# Patient Record
Sex: FEMALE | Race: WHITE | Employment: UNEMPLOYED | URBAN - METROPOLITAN AREA
[De-identification: names, ages, dates, MRNs, and addresses within clinical notes are randomized per-mention and may not be internally consistent; named-entity substitution may affect disease eponyms.]

---
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
## 2024-10-11 ENCOUNTER — OFFICE VISIT (OUTPATIENT)
Dept: OBGYN CLINIC | Age: 19
End: 2024-10-11

## 2024-10-11 VITALS
HEIGHT: 66 IN | HEART RATE: 84 BPM | DIASTOLIC BLOOD PRESSURE: 58 MMHG | WEIGHT: 111 LBS | BODY MASS INDEX: 17.84 KG/M2 | SYSTOLIC BLOOD PRESSURE: 92 MMHG

## 2024-10-11 DIAGNOSIS — N91.2 AMENORRHEA: Primary | ICD-10-CM

## 2024-10-11 DIAGNOSIS — Z34.82 ENCOUNTER FOR SUPERVISION OF OTHER NORMAL PREGNANCY, SECOND TRIMESTER: ICD-10-CM

## 2024-10-11 DIAGNOSIS — Z78.9 DATE OF LAST MENSTRUAL PERIOD (LMP) UNKNOWN: ICD-10-CM

## 2024-10-11 DIAGNOSIS — Z72.51 UNPROTECTED SEXUAL INTERCOURSE: ICD-10-CM

## 2024-10-11 RX ORDER — PNV NO.95/FERROUS FUM/FOLIC AC 28MG-0.8MG
1 TABLET ORAL DAILY
Qty: 60 TABLET | Refills: 1 | Status: SHIPPED | OUTPATIENT
Start: 2024-10-11

## 2024-10-11 SDOH — ECONOMIC STABILITY: FOOD INSECURITY: WITHIN THE PAST 12 MONTHS, THE FOOD YOU BOUGHT JUST DIDN'T LAST AND YOU DIDN'T HAVE MONEY TO GET MORE.: NEVER TRUE

## 2024-10-11 SDOH — ECONOMIC STABILITY: FOOD INSECURITY: WITHIN THE PAST 12 MONTHS, YOU WORRIED THAT YOUR FOOD WOULD RUN OUT BEFORE YOU GOT MONEY TO BUY MORE.: NEVER TRUE

## 2024-10-11 SDOH — ECONOMIC STABILITY: INCOME INSECURITY: HOW HARD IS IT FOR YOU TO PAY FOR THE VERY BASICS LIKE FOOD, HOUSING, MEDICAL CARE, AND HEATING?: NOT HARD AT ALL

## 2024-10-11 ASSESSMENT — PATIENT HEALTH QUESTIONNAIRE - PHQ9
2. FEELING DOWN, DEPRESSED OR HOPELESS: NOT AT ALL
SUM OF ALL RESPONSES TO PHQ9 QUESTIONS 1 & 2: 0
SUM OF ALL RESPONSES TO PHQ QUESTIONS 1-9: 0
1. LITTLE INTEREST OR PLEASURE IN DOING THINGS: NOT AT ALL
SUM OF ALL RESPONSES TO PHQ QUESTIONS 1-9: 0

## 2024-10-11 NOTE — PROGRESS NOTES
beyond the gestational age for Nuchal translucency evaluation Quad testing was recommended. Timing for the Quad test was reviewed. Benefits of the above testing was reviewed. A second trimester amniocentesis was also made available to the patient. Risks, Benefits and non-invasive alternative testing was reviewed.     The literature regarding a questionable link to pitocin augmentation and induction of labor, the assistance of labor contractions and the initiation of contractions to help delivery, have been reviewed with the patient regarding the increased potential of having a  with Attention Deficit Hyperactivity Disorder and or Autism. These two disorders and the ramifications of their impact on a child and the family caring for that child has been reviewed with the patient in detail. She was given the risks, benefits and alternatives of the use of this medication. She has agreed to its use in the delivery of her unborn child if needed at the time of delivery, Yes.    The patient was questioned in detail regarding any genetic misnomer history, chromosomal abnormalities, or learning disabilities in  herself, the father of the baby or their families. SHE DENIED ANY HISTORY AS STATED ABOVE: Yes    Upon completion of the visit all questions were answered and the patients follow-up and testing schedule were reviewed.  Prenatal vitamins were given.    Initial labs drawn.  Prenatal vitamins.  Problem list reviewed and updated.  Counseled about QUAD/ NIPT  Role of ultrasound in pregnancy discussed  Follow-up in 2 weeks  Early 1 hr OGTT - indicated ordered  Hep C screen indicated : no  FLU- not given   TDAP- to be given in third trimester     Suzanne Lang M.D., F.A.C.O.G     Transvaginal OB ultrasound done on 10/11/2024  Crown-rump length 40.8 mm consistent with 11 weeks gestational age final due date is May 2, 2025  Positive fetal heart tones, adequate EDGAR, normal placenta    Suzanne Lang M.D., F.A.C.O.G

## 2024-11-05 ENCOUNTER — INITIAL PRENATAL (OUTPATIENT)
Dept: OBGYN CLINIC | Age: 19
End: 2024-11-05
Payer: COMMERCIAL

## 2024-11-05 VITALS
DIASTOLIC BLOOD PRESSURE: 60 MMHG | WEIGHT: 114 LBS | BODY MASS INDEX: 18.4 KG/M2 | HEART RATE: 64 BPM | SYSTOLIC BLOOD PRESSURE: 100 MMHG

## 2024-11-05 DIAGNOSIS — N91.2 AMENORRHEA: ICD-10-CM

## 2024-11-05 DIAGNOSIS — Z3A.14 14 WEEKS GESTATION OF PREGNANCY: Primary | ICD-10-CM

## 2024-11-05 DIAGNOSIS — Z72.51 UNPROTECTED SEXUAL INTERCOURSE: ICD-10-CM

## 2024-11-05 LAB
AMPHET UR QL SCN: NORMAL
BACTERIA URNS QL MICRO: ABNORMAL /HPF
BARBITURATES UR QL SCN: NORMAL
BENZODIAZ UR QL SCN: NORMAL
BILIRUB UR QL STRIP: NEGATIVE
BUPRENORPHINE+NOR UR QL SCN: NORMAL
CANNABINOIDS UR QL SCN: NORMAL
CLARITY UR: CLEAR
COCAINE UR QL SCN: NORMAL
COLOR UR: YELLOW
DRUG SCREEN COMMENT UR-IMP: NORMAL
EPI CELLS #/AREA URNS AUTO: ABNORMAL /HPF (ref 0–5)
FENTANYL SCREEN, URINE: NORMAL
GLUCOSE UR STRIP-MCNC: NEGATIVE MG/DL
HGB UR QL STRIP: NEGATIVE
HYALINE CASTS #/AREA URNS AUTO: ABNORMAL /HPF (ref 0–5)
KETONES UR STRIP-MCNC: NEGATIVE MG/DL
LEUKOCYTE ESTERASE UR QL STRIP: ABNORMAL
METHADONE UR QL SCN: NORMAL
NITRITE UR QL STRIP: NEGATIVE
OPIATES UR QL SCN: NORMAL
OXYCODONE UR QL SCN: NORMAL
PCP UR QL SCN: NORMAL
PH UR STRIP: 6.5 [PH] (ref 5–9)
PROPOXYPH UR QL SCN: NORMAL
PROT UR STRIP-MCNC: ABNORMAL MG/DL
RBC #/AREA URNS HPF: ABNORMAL /HPF (ref 0–2)
SP GR UR STRIP: 1.02 (ref 1–1.03)
UROBILINOGEN UR STRIP-ACNC: 1 E.U./DL
WBC #/AREA URNS AUTO: ABNORMAL /HPF (ref 0–5)

## 2024-11-05 PROCEDURE — G8427 DOCREV CUR MEDS BY ELIG CLIN: HCPCS | Performed by: OBSTETRICS & GYNECOLOGY

## 2024-11-05 PROCEDURE — 99213 OFFICE O/P EST LOW 20 MIN: CPT | Performed by: OBSTETRICS & GYNECOLOGY

## 2024-11-05 PROCEDURE — 1036F TOBACCO NON-USER: CPT | Performed by: OBSTETRICS & GYNECOLOGY

## 2024-11-05 PROCEDURE — G8419 CALC BMI OUT NRM PARAM NOF/U: HCPCS | Performed by: OBSTETRICS & GYNECOLOGY

## 2024-11-05 PROCEDURE — G8484 FLU IMMUNIZE NO ADMIN: HCPCS | Performed by: OBSTETRICS & GYNECOLOGY

## 2024-11-05 RX ORDER — CYCLOBENZAPRINE HCL 10 MG
10 TABLET ORAL NIGHTLY PRN
Qty: 30 TABLET | Refills: 0 | Status: SHIPPED | OUTPATIENT
Start: 2024-11-05 | End: 2024-12-05

## 2024-11-05 RX ORDER — AZITHROMYCIN 250 MG/1
TABLET, FILM COATED ORAL
Qty: 6 TABLET | Refills: 0 | Status: SHIPPED | OUTPATIENT
Start: 2024-11-05 | End: 2024-11-15

## 2024-11-05 NOTE — PATIENT INSTRUCTIONS
[place] Housing Resources*  (Call 211/[employer] if need more resources.)        HOMELESS:  [facility] [place]:   What they offer: operate the [facility] located at [address] in [place]. This shelter serves as the only 24 hours, 365 days emergency shelter for men, women, and children. We offer a full service, 68 bed facility that provides meals, toiletries, laundry, and clothing as well as connections necessary to transition into independent housing.  Phone Number: [phone]  Website: [email or web address]     [facility]  What they offer: Homeless support/shelter.   Phone number: [phone]    Coordinated Entry:   What they offer: a system that allows for coordinated entry into a local homeless services system, as well as coordinated movement within and ultimately exit from the system.  Phone Number: 335.853.7093  Website: [email or web address]/coordinated-entry      HOUSING:   [facility] Housing:   What they Offer: providing safe, decent, and affordable housing for the residents of [facility].  Phone Number: 950.621.6682    Website: http://www.[employer].com.[employer]  [facility] [place] [facility]:   What they offer: Help veterans pay for rent   Phone Number: 157.812.3882  Website: https://www.[employer]    [facility]:  What they offer: Transitional housing for veterans  Phone number: [phone]  Website: www.[employer]      [facility]:  What they offer: Shelter for victims of domestic violence in [facility]  Phone number: [phone]

## 2024-11-05 NOTE — PROGRESS NOTES
OB visit    Celsa Amador is a 17 y.o. year old female  @14.4 wks by office 11 wk US , L unknown . Final MENDEL 25   . Complaints : denies.     POB: FTSVD @ 39 wks , Male , 6lb7oz .     PGYN: wil     PMH: denies     PSH: right knee- mass. 2017     MEDS: denies     Drug Allergies: NKDA    SOCHX: denies x 3    FH: Mother or Father , DM YES , HTN No, Other No    /60   Pulse 64   Wt 51.7 kg (114 lb)   LMP  (LMP Unknown)   BMI 18.40 kg/m²   Past Medical History:   Diagnosis Date    Bursal cyst     Right knee     Dysmenorrhea in adolescent 2021    Osteochondroma of right femur     Distal    Osteochondroma of right femur      Past Surgical History:   Procedure Laterality Date    FEMUR SURGERY Right 2017    Removal of osteochondroma from the right femur    [place] I&D DEEP ABSC BURSA/HEMATOMA THIGH/KNEE REGION Right 2017    RIGHT FEMUR OPEN EXCISION DISTAL FEMUR OSTEOCHONDROMA  (30 MIN) performed by Lauro Hancock MD at [facility] OR         Review of Systems  Constitutional: negative  Genitourinary:see above  Integument/breast: negative  Behavioral/Psych: negative  Endocrine: negative     All other systems reviewed and are negative.       Physical Exam:  /60   Pulse 64   Wt 51.7 kg (114 lb)   LMP  (LMP Unknown)   BMI 18.40 kg/m²   Skin: Warm, dry, no lesions or rashes  Extremities: Without clubbing, cyanosis and edema. Palms and nails are normal. Ambulates without difficulty  Neurological: No gross sensory or motor deficits.  Abdomen: Soft, non-tender without masses or organomegaly  bowel sounds normoactive    HOF: suprapubic     FHT : 150 bpm     Assessment:   1. 14 weeks gestation of pregnancy          Plan:   No orders of the defined types were placed in this encounter.       Orders Placed This Encounter   Medications    cyclobenzaprine (FLEXERIL) 10 MG tablet     Sig: Take 1 tablet by mouth nightly as needed for Muscle spasms     Dispense:  30 tablet     Refill:  0

## 2024-11-06 RX ORDER — NITROFURANTOIN 25; 75 MG/1; MG/1
100 CAPSULE ORAL 2 TIMES DAILY
Qty: 6 CAPSULE | Refills: 0 | Status: SHIPPED | OUTPATIENT
Start: 2024-11-06 | End: 2024-11-09

## 2024-11-07 LAB — BACTERIA UR CULT: NORMAL

## 2024-11-08 LAB
C TRACH DNA UR QL NAA+PROBE: NEGATIVE
N GONORRHOEA DNA UR QL NAA+PROBE: NEGATIVE

## 2024-11-20 DIAGNOSIS — Z72.51 UNPROTECTED SEXUAL INTERCOURSE: ICD-10-CM

## 2024-11-20 DIAGNOSIS — N91.2 AMENORRHEA: ICD-10-CM

## 2024-11-20 LAB
BASOPHILS # BLD: 0.1 K/UL (ref 0–0.2)
BASOPHILS NFR BLD: 0.5 %
EOSINOPHIL # BLD: 0.1 K/UL (ref 0–0.7)
EOSINOPHIL NFR BLD: 0.6 %
ERYTHROCYTE [DISTWIDTH] IN BLOOD BY AUTOMATED COUNT: 12.6 % (ref 11.5–14.5)
GONADOTROPIN, CHORIONIC (HCG) QUANT: NORMAL MIU/ML
HBV SURFACE AG SERPL QL IA: NORMAL
HCT VFR BLD AUTO: 37.3 % (ref 37–47)
HGB BLD-MCNC: 13.1 G/DL (ref 12–16)
LYMPHOCYTES # BLD: 1.7 K/UL (ref 1–4.8)
LYMPHOCYTES NFR BLD: 16.5 %
MCH RBC QN AUTO: 30.1 PG (ref 27–31.3)
MCHC RBC AUTO-ENTMCNC: 35.1 % (ref 33–37)
MCV RBC AUTO: 85.7 FL (ref 79.4–94.8)
MONOCYTES # BLD: 0.4 K/UL (ref 0.2–0.8)
MONOCYTES NFR BLD: 4.1 %
NEUTROPHILS # BLD: 8.1 K/UL (ref 1.4–6.5)
NEUTS SEG NFR BLD: 78.1 %
PLATELET # BLD AUTO: 251 K/UL (ref 130–400)
RBC # BLD AUTO: 4.35 M/UL (ref 4.2–5.4)
RUBELLA ANTIBODY IGG: 281.8 IU/ML
WBC # BLD AUTO: 10.3 K/UL (ref 4.5–11)

## 2024-11-21 LAB
ABO/RH: NORMAL
ANTIBODY SCREEN: NORMAL
HEPATITIS C ANTIBODY: NONREACTIVE
HIV AG/AB: NONREACTIVE
RPR SER QL: NORMAL

## 2024-11-22 ENCOUNTER — TELEPHONE (OUTPATIENT)
Dept: OBGYN CLINIC | Age: 19
End: 2024-11-22

## 2024-11-22 LAB
HGB A1 MFR BLD: 97.1 % (ref 95–97.9)
HGB A2 MFR BLD: 2.7 % (ref 2–3.5)
HGB C MFR BLD: 0 % (ref 0–0)
HGB E MFR BLD: 0 % (ref 0–0)
HGB F MFR BLD: 0.2 % (ref 0–2.1)
HGB FRACT BLD ELPH-IMP: NORMAL
HGB OTHER MFR BLD: 0 % (ref 0–0)
HGB S BLD QL SOLY: NORMAL
HGB S MFR BLD: 0 % (ref 0–0)
PATH INTERP BLD-IMP: NORMAL
VZV IGG SER IA-ACNC: 2.2 S/CO

## 2024-11-22 NOTE — TELEPHONE ENCOUNTER
Patient recently on antibiotics for UTI. C/o vaginal itching and abnormal discharge. 17w pregnant.

## 2024-11-23 RX ORDER — FLUCONAZOLE 150 MG/1
TABLET ORAL
Qty: 3 TABLET | Refills: 0 | Status: SHIPPED | OUTPATIENT
Start: 2024-11-23

## 2024-12-03 ENCOUNTER — ROUTINE PRENATAL (OUTPATIENT)
Dept: OBGYN CLINIC | Age: 19
End: 2024-12-03
Payer: COMMERCIAL

## 2024-12-03 VITALS
BODY MASS INDEX: 18.72 KG/M2 | WEIGHT: 116 LBS | SYSTOLIC BLOOD PRESSURE: 94 MMHG | HEART RATE: 80 BPM | DIASTOLIC BLOOD PRESSURE: 60 MMHG

## 2024-12-03 DIAGNOSIS — Z34.82 ENCOUNTER FOR SUPERVISION OF OTHER NORMAL PREGNANCY IN SECOND TRIMESTER: Primary | ICD-10-CM

## 2024-12-03 DIAGNOSIS — Z3A.18 18 WEEKS GESTATION OF PREGNANCY: ICD-10-CM

## 2024-12-03 PROCEDURE — G8420 CALC BMI NORM PARAMETERS: HCPCS | Performed by: OBSTETRICS & GYNECOLOGY

## 2024-12-03 PROCEDURE — 99213 OFFICE O/P EST LOW 20 MIN: CPT | Performed by: OBSTETRICS & GYNECOLOGY

## 2024-12-03 PROCEDURE — 1036F TOBACCO NON-USER: CPT | Performed by: OBSTETRICS & GYNECOLOGY

## 2024-12-03 PROCEDURE — G8427 DOCREV CUR MEDS BY ELIG CLIN: HCPCS | Performed by: OBSTETRICS & GYNECOLOGY

## 2024-12-03 PROCEDURE — G8484 FLU IMMUNIZE NO ADMIN: HCPCS | Performed by: OBSTETRICS & GYNECOLOGY

## 2024-12-03 NOTE — PROGRESS NOTES
OB visit    Celsa Amador is a 17 y.o. year old female  @18.4 wks by office 11 wk US , L unknown . Final MENDEL 25   . Complaints : denies.     POB: FTSVD @ 39 wks , Male , 6lb7oz .     PGYN: wil     PMH: denies     PSH: right knee- mass. 2017     MEDS: denies     Drug Allergies: NKDA    SOCHX: denies x 3    FH: Mother or Father , DM YES , HTN No, Other No    BP 94/60   Pulse 80   Wt 52.6 kg (116 lb)   LMP  (LMP Unknown)   BMI 18.72 kg/m²   Past Medical History:   Diagnosis Date    Bursal cyst     Right knee     Dysmenorrhea in adolescent 2021    Osteochondroma of right femur     Distal    Osteochondroma of right femur      Past Surgical History:   Procedure Laterality Date    FEMUR SURGERY Right 2017    Removal of osteochondroma from the right femur    [place] I&D DEEP ABSC BURSA/HEMATOMA THIGH/KNEE REGION Right 2017    RIGHT FEMUR OPEN EXCISION DISTAL FEMUR OSTEOCHONDROMA  (30 MIN) performed by Lauro Hancock MD at [facility] OR         Review of Systems  Constitutional: negative  Genitourinary:see above  Integument/breast: negative  Behavioral/Psych: negative  Endocrine: negative     All other systems reviewed and are negative.       Physical Exam:  BP 94/60   Pulse 80   Wt 52.6 kg (116 lb)   LMP  (LMP Unknown)   BMI 18.72 kg/m²   Skin: Warm, dry, no lesions or rashes  Extremities: Without clubbing, cyanosis and edema. Palms and nails are normal. Ambulates without difficulty  Neurological: No gross sensory or motor deficits.  Abdomen: Soft, non-tender without masses or organomegaly  bowel sounds normoactive    HOF: umbilical     FHT : 150 bpm     Assessment:   1. Encounter for supervision of other normal pregnancy in second trimester    2. 18 weeks gestation of pregnancy          Plan:   Orders Placed This Encounter   Procedures    US OB 14 PLUS WEEKS SINGLE OR FIRST GESTATION     This procedure can be scheduled via [employer].      Standing Status:   Future     Standing Expiration Date:

## 2024-12-12 ENCOUNTER — HOSPITAL ENCOUNTER (OUTPATIENT)
Dept: ULTRASOUND IMAGING | Age: 19
Discharge: HOME | End: 2024-12-14
Payer: COMMERCIAL

## 2024-12-12 DIAGNOSIS — Z34.82 ENCOUNTER FOR SUPERVISION OF OTHER NORMAL PREGNANCY IN SECOND TRIMESTER: ICD-10-CM

## 2024-12-12 PROCEDURE — 76805 OB US >/= 14 WKS SNGL FETUS: CPT

## 2025-01-03 ENCOUNTER — ROUTINE PRENATAL (OUTPATIENT)
Dept: OBGYN CLINIC | Age: 20
End: 2025-01-03
Payer: COMMERCIAL

## 2025-01-03 VITALS
SYSTOLIC BLOOD PRESSURE: 94 MMHG | BODY MASS INDEX: 19.21 KG/M2 | HEART RATE: 84 BPM | DIASTOLIC BLOOD PRESSURE: 60 MMHG | WEIGHT: 119 LBS

## 2025-01-03 DIAGNOSIS — Z34.82 ENCOUNTER FOR SUPERVISION OF OTHER NORMAL PREGNANCY IN SECOND TRIMESTER: Primary | ICD-10-CM

## 2025-01-03 DIAGNOSIS — Z3A.23 23 WEEKS GESTATION OF PREGNANCY: ICD-10-CM

## 2025-01-03 PROCEDURE — G8427 DOCREV CUR MEDS BY ELIG CLIN: HCPCS | Performed by: OBSTETRICS & GYNECOLOGY

## 2025-01-03 PROCEDURE — 99213 OFFICE O/P EST LOW 20 MIN: CPT | Performed by: OBSTETRICS & GYNECOLOGY

## 2025-01-03 PROCEDURE — 1036F TOBACCO NON-USER: CPT | Performed by: OBSTETRICS & GYNECOLOGY

## 2025-01-03 PROCEDURE — G8420 CALC BMI NORM PARAMETERS: HCPCS | Performed by: OBSTETRICS & GYNECOLOGY

## 2025-01-03 ASSESSMENT — PATIENT HEALTH QUESTIONNAIRE - PHQ9
SUM OF ALL RESPONSES TO PHQ QUESTIONS 1-9: 0
2. FEELING DOWN, DEPRESSED OR HOPELESS: NOT AT ALL
1. LITTLE INTEREST OR PLEASURE IN DOING THINGS: NOT AT ALL
SUM OF ALL RESPONSES TO PHQ9 QUESTIONS 1 & 2: 0

## 2025-01-03 NOTE — PROGRESS NOTES
OB visit    Celsa Amador is a 17 y.o. year old female  @ 23 wks by office 11 wk US , L unknown . Final MENDEL 25   . Complaints : denies.     POB: FTSVD @ 39 wks , Male , 6lb7oz .     PGYN: wil     PMH: denies     PSH: right knee- mass. 2017     MEDS: denies     Drug Allergies: NKDA    SOCHX: denies x 3    FH: Mother or Father , DM YES , HTN No, Other No    BP 94/60   Pulse 84   Wt 54 kg (119 lb)   LMP  (LMP Unknown)   BMI 19.21 kg/m²   Past Medical History:   Diagnosis Date    Bursal cyst     Right knee     Dysmenorrhea in adolescent 2021    Osteochondroma of right femur     Distal    Osteochondroma of right femur      Past Surgical History:   Procedure Laterality Date    FEMUR SURGERY Right 2017    Removal of osteochondroma from the right femur    [place] I&D DEEP ABSC BURSA/HEMATOMA THIGH/KNEE REGION Right 2017    RIGHT FEMUR OPEN EXCISION DISTAL FEMUR OSTEOCHONDROMA  (30 MIN) performed by Lauro Hancock MD at [facility] OR         Review of Systems  Constitutional: negative  Genitourinary:see above  Integument/breast: negative  Behavioral/Psych: negative  Endocrine: negative     All other systems reviewed and are negative.       Physical Exam:  BP 94/60   Pulse 84   Wt 54 kg (119 lb)   LMP  (LMP Unknown)   BMI 19.21 kg/m²   Skin: Warm, dry, no lesions or rashes  Extremities: Without clubbing, cyanosis and edema. Palms and nails are normal. Ambulates without difficulty  Neurological: No gross sensory or motor deficits.  Abdomen: Soft, non-tender without masses or organomegaly  bowel sounds normoactive    HOF: umbilical     FHT : 150 bpm     Assessment:   1. Encounter for supervision of other normal pregnancy in second trimester    2. 23 weeks gestation of pregnancy          Plan:   Orders Placed This Encounter   Procedures    CBC with Auto Differential     Standing Status:   Future     Standing Expiration Date:   1/3/2026    Glucose tolerance, 1 hour     Standing Status:   Future

## 2025-01-29 DIAGNOSIS — Z34.82 ENCOUNTER FOR SUPERVISION OF OTHER NORMAL PREGNANCY IN SECOND TRIMESTER: ICD-10-CM

## 2025-01-29 LAB
BASOPHILS # BLD: 0 K/UL (ref 0–0.2)
BASOPHILS NFR BLD: 0.2 %
EOSINOPHIL # BLD: 0.1 K/UL (ref 0–0.7)
EOSINOPHIL NFR BLD: 0.9 %
ERYTHROCYTE [DISTWIDTH] IN BLOOD BY AUTOMATED COUNT: 12.8 % (ref 11.5–14.5)
GLUCOSE, 1HR PP: 109 MG/DL (ref 60–140)
HCT VFR BLD AUTO: 35.1 % (ref 37–47)
HGB BLD-MCNC: 11.7 G/DL (ref 12–16)
LYMPHOCYTES # BLD: 1 K/UL (ref 1–4.8)
LYMPHOCYTES NFR BLD: 11.6 %
MCH RBC QN AUTO: 29.5 PG (ref 27–31.3)
MCHC RBC AUTO-ENTMCNC: 33.3 % (ref 33–37)
MCV RBC AUTO: 88.4 FL (ref 79.4–94.8)
MONOCYTES # BLD: 0.4 K/UL (ref 0.2–0.8)
MONOCYTES NFR BLD: 5 %
NEUTROPHILS # BLD: 7.2 K/UL (ref 1.4–6.5)
NEUTS SEG NFR BLD: 81.8 %
PLATELET # BLD AUTO: 192 K/UL (ref 130–400)
RBC # BLD AUTO: 3.97 M/UL (ref 4.2–5.4)
REAGIN+T PALLIDUM IGG+IGM SERPL-IMP: NORMAL
WBC # BLD AUTO: 8.8 K/UL (ref 4.5–11)

## 2025-02-03 ENCOUNTER — ROUTINE PRENATAL (OUTPATIENT)
Dept: OBGYN CLINIC | Age: 20
End: 2025-02-03
Payer: COMMERCIAL

## 2025-02-03 VITALS
BODY MASS INDEX: 20.34 KG/M2 | WEIGHT: 126 LBS | HEART RATE: 68 BPM | DIASTOLIC BLOOD PRESSURE: 60 MMHG | SYSTOLIC BLOOD PRESSURE: 102 MMHG

## 2025-02-03 DIAGNOSIS — Z34.82 ENCOUNTER FOR SUPERVISION OF OTHER NORMAL PREGNANCY IN SECOND TRIMESTER: Primary | ICD-10-CM

## 2025-02-03 DIAGNOSIS — O26.843 FUNDAL HEIGHT LOW FOR DATES IN THIRD TRIMESTER: ICD-10-CM

## 2025-02-03 DIAGNOSIS — Z3A.27 27 WEEKS GESTATION OF PREGNANCY: ICD-10-CM

## 2025-02-03 PROCEDURE — 99213 OFFICE O/P EST LOW 20 MIN: CPT | Performed by: OBSTETRICS & GYNECOLOGY

## 2025-02-03 PROCEDURE — 90471 IMMUNIZATION ADMIN: CPT | Performed by: OBSTETRICS & GYNECOLOGY

## 2025-02-03 PROCEDURE — 1036F TOBACCO NON-USER: CPT | Performed by: OBSTETRICS & GYNECOLOGY

## 2025-02-03 PROCEDURE — 90715 TDAP VACCINE 7 YRS/> IM: CPT | Performed by: OBSTETRICS & GYNECOLOGY

## 2025-02-03 PROCEDURE — G8420 CALC BMI NORM PARAMETERS: HCPCS | Performed by: OBSTETRICS & GYNECOLOGY

## 2025-02-03 PROCEDURE — G8427 DOCREV CUR MEDS BY ELIG CLIN: HCPCS | Performed by: OBSTETRICS & GYNECOLOGY

## 2025-02-03 SDOH — ECONOMIC STABILITY: FOOD INSECURITY: WITHIN THE PAST 12 MONTHS, THE FOOD YOU BOUGHT JUST DIDN'T LAST AND YOU DIDN'T HAVE MONEY TO GET MORE.: NEVER TRUE

## 2025-02-03 SDOH — ECONOMIC STABILITY: FOOD INSECURITY: WITHIN THE PAST 12 MONTHS, YOU WORRIED THAT YOUR FOOD WOULD RUN OUT BEFORE YOU GOT MONEY TO BUY MORE.: NEVER TRUE

## 2025-02-03 NOTE — PROGRESS NOTES
OB visit    Celsa Amador is a 17 y.o. year old female  @ 27.3 wks by office 11 wk US , L unknown . Final MENDEL 25   . Complaints : denies.     POB: FTSVD @ 39 wks , Male , 6lb7oz .     PGYN: wil     PMH: denies     PSH: right knee- mass. 2017     MEDS: denies     Drug Allergies: NKDA    SOCHX: denies x 3    FH: Mother or Father , DM YES , HTN No, Other No    /60   Pulse 68   Wt 57.2 kg (126 lb)   LMP  (LMP Unknown)   BMI 20.34 kg/m²   Past Medical History:   Diagnosis Date    Bursal cyst     Right knee     Dysmenorrhea in adolescent 2021    Osteochondroma of right femur     Distal    Osteochondroma of right femur      Past Surgical History:   Procedure Laterality Date    FEMUR SURGERY Right 2017    Removal of osteochondroma from the right femur    [place] I&D DEEP ABSC BURSA/HEMATOMA THIGH/KNEE REGION Right 2017    RIGHT FEMUR OPEN EXCISION DISTAL FEMUR OSTEOCHONDROMA  (30 MIN) performed by Lauro Hancock MD at [facility] OR         Review of Systems  Constitutional: negative  Genitourinary:see above  Integument/breast: negative  Behavioral/Psych: negative  Endocrine: negative     All other systems reviewed and are negative.       Physical Exam:  /60   Pulse 68   Wt 57.2 kg (126 lb)   LMP  (LMP Unknown)   BMI 20.34 kg/m²   Skin: Warm, dry, no lesions or rashes  Extremities: Without clubbing, cyanosis and edema. Palms and nails are normal. Ambulates without difficulty  Neurological: No gross sensory or motor deficits.  Abdomen: Soft, non-tender without masses or organomegaly  bowel sounds normoactive    HOF: 27 cm     FHT : 150 bpm     Assessment:   1. Encounter for supervision of other normal pregnancy in second trimester    2. 27 weeks gestation of pregnancy    3. Fundal height low for dates in third trimester            Plan:   Orders Placed This Encounter   Procedures    US OB 1 OR MORE FETUS LIMITED     This procedure can be scheduled via MyChart.      Standing Status:

## 2025-02-06 ENCOUNTER — HOSPITAL ENCOUNTER (OUTPATIENT)
Dept: ULTRASOUND IMAGING | Age: 20
Discharge: HOME | End: 2025-02-08
Payer: COMMERCIAL

## 2025-02-06 DIAGNOSIS — O26.843 FUNDAL HEIGHT LOW FOR DATES IN THIRD TRIMESTER: ICD-10-CM

## 2025-02-06 DIAGNOSIS — Z34.82 ENCOUNTER FOR SUPERVISION OF OTHER NORMAL PREGNANCY IN SECOND TRIMESTER: ICD-10-CM

## 2025-02-06 PROCEDURE — 76817 TRANSVAGINAL US OBSTETRIC: CPT

## 2025-02-06 PROCEDURE — 76815 OB US LIMITED FETUS(S): CPT

## 2025-02-13 RX ORDER — METRONIDAZOLE 500 MG/1
500 TABLET ORAL 2 TIMES DAILY
Qty: 14 TABLET | Refills: 0 | Status: SHIPPED | OUTPATIENT
Start: 2025-02-13 | End: 2025-02-20

## 2025-02-24 ENCOUNTER — ROUTINE PRENATAL (OUTPATIENT)
Dept: OBGYN CLINIC | Age: 20
End: 2025-02-24
Payer: COMMERCIAL

## 2025-02-24 VITALS
SYSTOLIC BLOOD PRESSURE: 112 MMHG | HEART RATE: 80 BPM | BODY MASS INDEX: 20.66 KG/M2 | WEIGHT: 128 LBS | DIASTOLIC BLOOD PRESSURE: 64 MMHG

## 2025-02-24 DIAGNOSIS — Z34.82 ENCOUNTER FOR SUPERVISION OF OTHER NORMAL PREGNANCY IN SECOND TRIMESTER: Primary | ICD-10-CM

## 2025-02-24 DIAGNOSIS — Z3A.30 30 WEEKS GESTATION OF PREGNANCY: ICD-10-CM

## 2025-02-24 PROCEDURE — 99213 OFFICE O/P EST LOW 20 MIN: CPT | Performed by: OBSTETRICS & GYNECOLOGY

## 2025-02-24 PROCEDURE — G8420 CALC BMI NORM PARAMETERS: HCPCS | Performed by: OBSTETRICS & GYNECOLOGY

## 2025-02-24 PROCEDURE — G8427 DOCREV CUR MEDS BY ELIG CLIN: HCPCS | Performed by: OBSTETRICS & GYNECOLOGY

## 2025-02-24 PROCEDURE — 1036F TOBACCO NON-USER: CPT | Performed by: OBSTETRICS & GYNECOLOGY

## 2025-02-24 NOTE — PROGRESS NOTES
OB visit    Celsa Amador is a 17 y.o. year old female  @ 30.3 wks by office 11 wk US , L unknown . Final MENDEL 25   . Complaints : denies.     POB: FTSVD @ 39 wks , Male , 6lb7oz .     PGYN: wil     PMH: denies     PSH: right knee- mass. 2017     MEDS: denies     Drug Allergies: NKDA    SOCHX: denies x 3    FH: Mother or Father , DM YES , HTN No, Other No    /64   Pulse 80   Wt 58.1 kg (128 lb)   LMP  (LMP Unknown)   BMI 20.66 kg/m²   Past Medical History:   Diagnosis Date    Bursal cyst     Right knee     Dysmenorrhea in adolescent 2021    Osteochondroma of right femur     Distal    Osteochondroma of right femur      Past Surgical History:   Procedure Laterality Date    FEMUR SURGERY Right 2017    Removal of osteochondroma from the right femur    [place] I&D DEEP ABSC BURSA/HEMATOMA THIGH/KNEE REGION Right 2017    RIGHT FEMUR OPEN EXCISION DISTAL FEMUR OSTEOCHONDROMA  (30 MIN) [facility]         Review of Systems  Constitutional: negative  Genitourinary:see above  Integument/breast: negative  Behavioral/Psych: negative  Endocrine: negative     All other systems reviewed and are negative.       Physical Exam:  /64   Pulse 80   Wt 58.1 kg (128 lb)   LMP  (LMP Unknown)   BMI 20.66 kg/m²   Skin: Warm, dry, no lesions or rashes  Extremities: Without clubbing, cyanosis and edema. Palms and nails are normal. Ambulates without difficulty  Neurological: No gross sensory or motor deficits.  Abdomen: Soft, non-tender without masses or organomegaly  bowel sounds normoactive    HOF: 30 cm     FHT : 150 bpm     Assessment:   1. Encounter for supervision of other normal pregnancy in second trimester    2. 30 weeks gestation of pregnancy          Plan:   No orders of the defined types were placed in this encounter.       No orders of the defined types were placed in this encounter.    Plan:   Suzanne Lang MD    Follow-up in 2 weeks  Early 1 hr OGTT -

## 2025-03-10 ENCOUNTER — ROUTINE PRENATAL (OUTPATIENT)
Dept: OBGYN CLINIC | Age: 20
End: 2025-03-10
Payer: COMMERCIAL

## 2025-03-10 VITALS
HEART RATE: 68 BPM | SYSTOLIC BLOOD PRESSURE: 100 MMHG | DIASTOLIC BLOOD PRESSURE: 62 MMHG | WEIGHT: 129 LBS | BODY MASS INDEX: 20.82 KG/M2

## 2025-03-10 DIAGNOSIS — Z34.82 ENCOUNTER FOR SUPERVISION OF OTHER NORMAL PREGNANCY IN SECOND TRIMESTER: Primary | ICD-10-CM

## 2025-03-10 DIAGNOSIS — Z3A.32 32 WEEKS GESTATION OF PREGNANCY: ICD-10-CM

## 2025-03-10 DIAGNOSIS — O26.843 FUNDAL HEIGHT LOW FOR DATES IN THIRD TRIMESTER: ICD-10-CM

## 2025-03-10 PROCEDURE — 99213 OFFICE O/P EST LOW 20 MIN: CPT | Performed by: OBSTETRICS & GYNECOLOGY

## 2025-03-10 PROCEDURE — G8420 CALC BMI NORM PARAMETERS: HCPCS | Performed by: OBSTETRICS & GYNECOLOGY

## 2025-03-10 PROCEDURE — G8427 DOCREV CUR MEDS BY ELIG CLIN: HCPCS | Performed by: OBSTETRICS & GYNECOLOGY

## 2025-03-10 PROCEDURE — 1036F TOBACCO NON-USER: CPT | Performed by: OBSTETRICS & GYNECOLOGY

## 2025-03-10 RX ORDER — NITROFURANTOIN 25; 75 MG/1; MG/1
100 CAPSULE ORAL 2 TIMES DAILY
Qty: 14 CAPSULE | Refills: 0 | Status: SHIPPED | OUTPATIENT
Start: 2025-03-10 | End: 2025-03-17

## 2025-03-10 NOTE — PROGRESS NOTES
OB visit    Celsa Amador is a 17 y.o. year old female  @ 32.3 wks by office 11 wk US , L unknown . Final MENDEL 25   . Complaints : denies.     POB: FTSVD @ 39 wks , Male , 6lb7oz .     PGYN: wil     PMH: denies     PSH: right knee- mass. 2017     MEDS: denies     Drug Allergies: NKDA    SOCHX: denies x 3    FH: Mother or Father , DM YES , HTN No, Other No    /62   Pulse 68   Wt 58.5 kg (129 lb)   LMP  (LMP Unknown)   BMI 20.82 kg/m²   Past Medical History:   Diagnosis Date    Bursal cyst     Right knee     Dysmenorrhea in adolescent 2021    Osteochondroma of right femur     Distal    Osteochondroma of right femur      Past Surgical History:   Procedure Laterality Date    FEMUR SURGERY Right 2017    Removal of osteochondroma from the right femur    [place] I&D DEEP ABSC BURSA/HEMATOMA THIGH/KNEE REGION Right 2017    RIGHT FEMUR OPEN EXCISION DISTAL FEMUR OSTEOCHONDROMA  (30 MIN) performed by Lauro Hancock MD at [facility] OR         Review of Systems  Constitutional: negative  Genitourinary:see above  Integument/breast: negative  Behavioral/Psych: negative  Endocrine: negative     All other systems reviewed and are negative.       Physical Exam:  /62   Pulse 68   Wt 58.5 kg (129 lb)   LMP  (LMP Unknown)   BMI 20.82 kg/m²   Skin: Warm, dry, no lesions or rashes  Extremities: Without clubbing, cyanosis and edema. Palms and nails are normal. Ambulates without difficulty  Neurological: No gross sensory or motor deficits.  Abdomen: Soft, non-tender without masses or organomegaly  bowel sounds normoactive    HOF: 31 cm     FHT : 150 bpm     Assessment:   1. Encounter for supervision of other normal pregnancy in second trimester    2. 32 weeks gestation of pregnancy    3. Fundal height low for dates in third trimester            Plan:   Orders Placed This Encounter   Procedures    US OB 1 OR MORE FETUS LIMITED     This procedure can be scheduled via MyChart.      Standing Status:

## 2025-03-13 ENCOUNTER — HOSPITAL ENCOUNTER (OUTPATIENT)
Dept: ULTRASOUND IMAGING | Age: 20
Discharge: HOME | End: 2025-03-15
Payer: COMMERCIAL

## 2025-03-13 DIAGNOSIS — O26.843 FUNDAL HEIGHT LOW FOR DATES IN THIRD TRIMESTER: ICD-10-CM

## 2025-03-13 PROCEDURE — 76817 TRANSVAGINAL US OBSTETRIC: CPT

## 2025-03-13 PROCEDURE — 76815 OB US LIMITED FETUS(S): CPT

## 2025-03-24 ENCOUNTER — ROUTINE PRENATAL (OUTPATIENT)
Dept: OBGYN CLINIC | Age: 20
End: 2025-03-24
Payer: COMMERCIAL

## 2025-03-24 VITALS
BODY MASS INDEX: 21.31 KG/M2 | DIASTOLIC BLOOD PRESSURE: 62 MMHG | HEART RATE: 84 BPM | WEIGHT: 132 LBS | SYSTOLIC BLOOD PRESSURE: 100 MMHG

## 2025-03-24 DIAGNOSIS — Z34.82 ENCOUNTER FOR SUPERVISION OF OTHER NORMAL PREGNANCY IN SECOND TRIMESTER: Primary | ICD-10-CM

## 2025-03-24 DIAGNOSIS — Z3A.34 34 WEEKS GESTATION OF PREGNANCY: ICD-10-CM

## 2025-03-24 PROCEDURE — G8420 CALC BMI NORM PARAMETERS: HCPCS | Performed by: OBSTETRICS & GYNECOLOGY

## 2025-03-24 PROCEDURE — 1036F TOBACCO NON-USER: CPT | Performed by: OBSTETRICS & GYNECOLOGY

## 2025-03-24 PROCEDURE — G8427 DOCREV CUR MEDS BY ELIG CLIN: HCPCS | Performed by: OBSTETRICS & GYNECOLOGY

## 2025-03-24 PROCEDURE — 99213 OFFICE O/P EST LOW 20 MIN: CPT | Performed by: OBSTETRICS & GYNECOLOGY

## 2025-03-24 NOTE — PROGRESS NOTES
OB visit    Celsa Amador is a 17 y.o. year old female  @ 34.3 wks by office 11 wk US , L unknown . Final MENDEL 25   . Complaints : denies.     POB: FTSVD @ 39 wks , Male , 6lb7oz .     PGYN: wil     PMH: denies     PSH: right knee- mass. 2017     MEDS: denies     Drug Allergies: NKDA    SOCHX: denies x 3    FH: Mother or Father , DM YES , HTN No, Other No    /62   Pulse 84   Wt 59.9 kg (132 lb)   LMP  (LMP Unknown)   BMI 21.31 kg/m²   Past Medical History:   Diagnosis Date    Bursal cyst     Right knee     Dysmenorrhea in adolescent 2021    Osteochondroma of right femur     Distal    Osteochondroma of right femur      Past Surgical History:   Procedure Laterality Date    FEMUR SURGERY Right 2017    Removal of osteochondroma from the right femur    [place] I&D DEEP ABSC BURSA/HEMATOMA THIGH/KNEE REGION Right 2017    RIGHT FEMUR OPEN EXCISION DISTAL FEMUR OSTEOCHONDROMA  (30 MIN) performed by Lauro Hancock MD at [facility] OR         Review of Systems  Constitutional: negative  Genitourinary:see above  Integument/breast: negative  Behavioral/Psych: negative  Endocrine: negative     All other systems reviewed and are negative.       Physical Exam:  /62   Pulse 84   Wt 59.9 kg (132 lb)   LMP  (LMP Unknown)   BMI 21.31 kg/m²   Skin: Warm, dry, no lesions or rashes  Extremities: Without clubbing, cyanosis and edema. Palms and nails are normal. Ambulates without difficulty  Neurological: No gross sensory or motor deficits.  Abdomen: Soft, non-tender without masses or organomegaly  bowel sounds normoactive    HOF: 31 cm     FHT : 150 bpm     Assessment:   1. Encounter for supervision of other normal pregnancy in second trimester    2. 34 weeks gestation of pregnancy              Plan:   No orders of the defined types were placed in this encounter.       No orders of the defined types were placed in this encounter.    Plan:   Suzanne Lang MD    Follow-up in 2 weeks  Early 1 hr

## 2025-04-07 ENCOUNTER — ROUTINE PRENATAL (OUTPATIENT)
Dept: OBGYN CLINIC | Age: 20
End: 2025-04-07
Payer: COMMERCIAL

## 2025-04-07 VITALS
DIASTOLIC BLOOD PRESSURE: 64 MMHG | WEIGHT: 134 LBS | SYSTOLIC BLOOD PRESSURE: 112 MMHG | HEART RATE: 84 BPM | BODY MASS INDEX: 21.63 KG/M2

## 2025-04-07 DIAGNOSIS — Z3A.36 36 WEEKS GESTATION OF PREGNANCY: ICD-10-CM

## 2025-04-07 DIAGNOSIS — Z34.83 ENCOUNTER FOR SUPERVISION OF OTHER NORMAL PREGNANCY IN THIRD TRIMESTER: ICD-10-CM

## 2025-04-07 DIAGNOSIS — Z34.83 ENCOUNTER FOR SUPERVISION OF OTHER NORMAL PREGNANCY IN THIRD TRIMESTER: Primary | ICD-10-CM

## 2025-04-07 PROCEDURE — 1036F TOBACCO NON-USER: CPT | Performed by: OBSTETRICS & GYNECOLOGY

## 2025-04-07 PROCEDURE — G8420 CALC BMI NORM PARAMETERS: HCPCS | Performed by: OBSTETRICS & GYNECOLOGY

## 2025-04-07 PROCEDURE — G8427 DOCREV CUR MEDS BY ELIG CLIN: HCPCS | Performed by: OBSTETRICS & GYNECOLOGY

## 2025-04-07 PROCEDURE — 99213 OFFICE O/P EST LOW 20 MIN: CPT | Performed by: OBSTETRICS & GYNECOLOGY

## 2025-04-08 NOTE — PROGRESS NOTES
Chaperone for Intimate Exam    1. Was chaperone offered as part of the rooming process? offered, accepted   2. If Chaperone is declined by patient, NA: chaperone was available and exam completed  3. Chaperone is Jessica Abrams LPN    
defined types were placed in this encounter.    Plan:   Suzanne Lang MD  IOL at 4/27/25 pitocin   Follow-up in 1 weeks  Early 1 hr OGTT - indicated ordered  Hep C screen indicated : no  FLU- not given   TDAP- to be given in third trimester     Suzanne Lang M.D., SANDRA.A.C.O.G     Transvaginal OB ultrasound done on 10/11/2024  Crown-rump length 40.8 mm consistent with 11 weeks gestational age final due date is May 2, 2025  Positive fetal heart tones, adequate EDGAR, normal placenta    Suzanne Lang M.D., F.A.C.O.G

## 2025-04-10 LAB — GP B STREP SPEC QL CULT: NORMAL

## 2025-04-14 ENCOUNTER — ROUTINE PRENATAL (OUTPATIENT)
Dept: OBGYN CLINIC | Age: 20
End: 2025-04-14
Payer: COMMERCIAL

## 2025-04-14 VITALS
DIASTOLIC BLOOD PRESSURE: 64 MMHG | HEART RATE: 84 BPM | WEIGHT: 137 LBS | SYSTOLIC BLOOD PRESSURE: 90 MMHG | BODY MASS INDEX: 22.11 KG/M2

## 2025-04-14 DIAGNOSIS — Z34.83 ENCOUNTER FOR SUPERVISION OF OTHER NORMAL PREGNANCY IN THIRD TRIMESTER: Primary | ICD-10-CM

## 2025-04-14 DIAGNOSIS — Z3A.37 37 WEEKS GESTATION OF PREGNANCY: ICD-10-CM

## 2025-04-14 PROCEDURE — 99213 OFFICE O/P EST LOW 20 MIN: CPT | Performed by: OBSTETRICS & GYNECOLOGY

## 2025-04-14 PROCEDURE — 1036F TOBACCO NON-USER: CPT | Performed by: OBSTETRICS & GYNECOLOGY

## 2025-04-14 PROCEDURE — G8427 DOCREV CUR MEDS BY ELIG CLIN: HCPCS | Performed by: OBSTETRICS & GYNECOLOGY

## 2025-04-14 PROCEDURE — G8420 CALC BMI NORM PARAMETERS: HCPCS | Performed by: OBSTETRICS & GYNECOLOGY

## 2025-04-14 NOTE — PROGRESS NOTES
OB visit    Celsa Amador is a 17 y.o. year old female  @ 37.3 wks by office 11 wk US , L unknown . Final MENDEL 25   . Complaints : denies.     POB: FTSVD @ 39 wks , Male , 6lb7oz .     PGYN: wil     PMH: denies     PSH: right knee- mass. 2017     MEDS: denies     Drug Allergies: NKDA    SOCHX: denies x 3    FH: Mother or Father , DM YES , HTN No, Other No    BP 90/64   Pulse 84   Wt 62.1 kg (137 lb)   LMP  (LMP Unknown)   BMI 22.11 kg/m²   Past Medical History:   Diagnosis Date    Bursal cyst     Right knee     Dysmenorrhea in adolescent 2021    Osteochondroma of right femur     Distal    Osteochondroma of right femur      Past Surgical History:   Procedure Laterality Date    FEMUR SURGERY Right 2017    Removal of osteochondroma from the right femur    [place] I&D DEEP ABSC BURSA/HEMATOMA THIGH/KNEE REGION Right 2017    RIGHT FEMUR OPEN EXCISION DISTAL FEMUR OSTEOCHONDROMA  (30 MIN) performed by Lauro Hancock MD at [facility] OR         Review of Systems  Constitutional: negative  Genitourinary:see above  Integument/breast: negative  Behavioral/Psych: negative  Endocrine: negative     All other systems reviewed and are negative.       Physical Exam:  BP 90/64   Pulse 84   Wt 62.1 kg (137 lb)   LMP  (LMP Unknown)   BMI 22.11 kg/m²   Skin: Warm, dry, no lesions or rashes  Extremities: Without clubbing, cyanosis and edema. Palms and nails are normal. Ambulates without difficulty  Neurological: No gross sensory or motor deficits.  Abdomen: Soft, non-tender without masses or organomegaly  bowel sounds normoactive    HOF: 37 cm     FHT : 150 bpm     SVE on 25 : 2 cm/ 60 % / -3     Assessment:   1. Encounter for supervision of other normal pregnancy in third trimester    2. 37 weeks gestation of pregnancy            Plan:   No orders of the defined types were placed in this encounter.       No orders of the defined types were placed in this encounter.    Plan:   Suzanne Lang MD  IOL

## 2025-04-21 ENCOUNTER — HOSPITAL ENCOUNTER (INPATIENT)
Age: 20
LOS: 1 days | Discharge: HOME | DRG: 560 | End: 2025-04-22
Attending: STUDENT IN AN ORGANIZED HEALTH CARE EDUCATION/TRAINING PROGRAM | Admitting: STUDENT IN AN ORGANIZED HEALTH CARE EDUCATION/TRAINING PROGRAM
Payer: COMMERCIAL

## 2025-04-21 ENCOUNTER — ANESTHESIA (OUTPATIENT)
Dept: LABOR AND DELIVERY | Age: 20
DRG: 560 | End: 2025-04-21
Payer: COMMERCIAL

## 2025-04-21 ENCOUNTER — ANESTHESIA EVENT (OUTPATIENT)
Dept: LABOR AND DELIVERY | Age: 20
DRG: 560 | End: 2025-04-21
Payer: COMMERCIAL

## 2025-04-21 PROBLEM — Z37.9 NORMAL LABOR: Status: RESOLVED | Noted: 2025-04-21 | Resolved: 2025-04-21

## 2025-04-21 PROBLEM — N94.6 DYSMENORRHEA IN ADOLESCENT: Status: RESOLVED | Noted: 2021-11-09 | Resolved: 2025-04-21

## 2025-04-21 PROBLEM — M22.40 CHONDROMALACIA PATELLAE, UNSPECIFIED KNEE: Status: RESOLVED | Noted: 2017-09-13 | Resolved: 2025-04-21

## 2025-04-21 PROBLEM — D16.21 BENIGN NEOPLASM OF LONG BONES OF RIGHT LOWER LIMB: Status: RESOLVED | Noted: 2017-12-20 | Resolved: 2025-04-21

## 2025-04-21 PROBLEM — D16.21 OSTEOCHONDROMA OF RIGHT FEMUR: Status: RESOLVED | Noted: 2017-06-08 | Resolved: 2025-04-21

## 2025-04-21 PROBLEM — Z37.9 NORMAL LABOR: Status: ACTIVE | Noted: 2025-04-21

## 2025-04-21 PROBLEM — Z34.93 NORMAL PREGNANCY IN THIRD TRIMESTER: Status: RESOLVED | Noted: 2023-07-21 | Resolved: 2025-04-21

## 2025-04-21 PROBLEM — Z04.9: Status: ACTIVE | Noted: 2025-04-21

## 2025-04-21 PROBLEM — R10.2 PAIN OF ROUND LIGAMENT DURING PREGNANCY: Status: RESOLVED | Noted: 2023-05-13 | Resolved: 2025-04-21

## 2025-04-21 PROBLEM — M71.30 BURSAL CYST: Status: RESOLVED | Noted: 2017-06-08 | Resolved: 2025-04-21

## 2025-04-21 PROBLEM — Z04.9: Status: RESOLVED | Noted: 2025-04-21 | Resolved: 2025-04-21

## 2025-04-21 PROBLEM — O26.899 PAIN OF ROUND LIGAMENT DURING PREGNANCY: Status: RESOLVED | Noted: 2023-05-13 | Resolved: 2025-04-21

## 2025-04-21 LAB
ABO/RH: NORMAL
ALBUMIN SERPL-MCNC: 3.7 G/DL (ref 3.5–4.6)
ALP SERPL-CCNC: 194 U/L (ref 40–130)
ALT SERPL-CCNC: 12 U/L (ref 0–33)
AMPHET UR QL SCN: NORMAL
ANION GAP SERPL CALCULATED.3IONS-SCNC: 13 MEQ/L (ref 9–15)
ANTIBODY SCREEN: NORMAL
AST SERPL-CCNC: 20 U/L (ref 0–35)
BACTERIA URNS QL MICRO: ABNORMAL /HPF
BARBITURATES UR QL SCN: NORMAL
BASOPHILS # BLD: 0 K/UL (ref 0–0.2)
BASOPHILS NFR BLD: 0.4 %
BENZODIAZ UR QL SCN: NORMAL
BILIRUB SERPL-MCNC: 0.3 MG/DL (ref 0.2–0.7)
BILIRUB UR QL STRIP: NEGATIVE
BUN SERPL-MCNC: 6 MG/DL (ref 6–20)
CALCIUM SERPL-MCNC: 9.3 MG/DL (ref 8.5–9.9)
CANNABINOIDS UR QL SCN: NORMAL
CHLORIDE SERPL-SCNC: 106 MEQ/L (ref 95–107)
CLARITY UR: CLEAR
CO2 SERPL-SCNC: 19 MEQ/L (ref 20–31)
COCAINE UR QL SCN: NORMAL
COLOR UR: YELLOW
CREAT SERPL-MCNC: 0.67 MG/DL (ref 0.5–0.9)
DRUG SCREEN COMMENT UR-IMP: NORMAL
EOSINOPHIL # BLD: 0.1 K/UL (ref 0–0.7)
EOSINOPHIL NFR BLD: 0.9 %
EPI CELLS #/AREA URNS AUTO: ABNORMAL /HPF (ref 0–5)
ERYTHROCYTE [DISTWIDTH] IN BLOOD BY AUTOMATED COUNT: 13.1 % (ref 11.5–14.5)
FENTANYL SCREEN, URINE: NORMAL
GLOBULIN SER CALC-MCNC: 2.7 G/DL (ref 2.3–3.5)
GLUCOSE SERPL-MCNC: 104 MG/DL (ref 70–99)
GLUCOSE UR STRIP-MCNC: NEGATIVE MG/DL
HBV SURFACE AG SERPL QL IA: NORMAL
HCT VFR BLD AUTO: 33.1 % (ref 37–47)
HGB BLD-MCNC: 11.1 G/DL (ref 12–16)
HGB UR QL STRIP: NEGATIVE
HIV AG/AB: NONREACTIVE
HYALINE CASTS #/AREA URNS AUTO: ABNORMAL /HPF (ref 0–5)
KETONES UR STRIP-MCNC: NEGATIVE MG/DL
LEUKOCYTE ESTERASE UR QL STRIP: ABNORMAL
LYMPHOCYTES # BLD: 1.9 K/UL (ref 1–4.8)
LYMPHOCYTES NFR BLD: 21.3 %
MCH RBC QN AUTO: 28.1 PG (ref 27–31.3)
MCHC RBC AUTO-ENTMCNC: 33.5 % (ref 33–37)
MCV RBC AUTO: 83.8 FL (ref 79.4–94.8)
METHADONE UR QL SCN: NORMAL
MONOCYTES # BLD: 0.7 K/UL (ref 0.2–0.8)
MONOCYTES NFR BLD: 7.3 %
NEUTROPHILS # BLD: 6.2 K/UL (ref 1.4–6.5)
NEUTS SEG NFR BLD: 69.4 %
NITRITE UR QL STRIP: NEGATIVE
OPIATES UR QL SCN: NORMAL
OXYCODONE UR QL SCN: NORMAL
PCP UR QL SCN: NORMAL
PH UR STRIP: 7.5 [PH] (ref 5–9)
PLATELET # BLD AUTO: 187 K/UL (ref 130–400)
POTASSIUM SERPL-SCNC: 3.9 MEQ/L (ref 3.4–4.9)
PROPOXYPH UR QL SCN: NORMAL
PROT SERPL-MCNC: 6.4 G/DL (ref 6.3–8)
PROT UR STRIP-MCNC: NEGATIVE MG/DL
RBC # BLD AUTO: 3.95 M/UL (ref 4.2–5.4)
RBC #/AREA URNS AUTO: ABNORMAL /HPF (ref 0–5)
REAGIN+T PALLIDUM IGG+IGM SERPL-IMP: NORMAL
SODIUM SERPL-SCNC: 138 MEQ/L (ref 135–144)
SP GR UR STRIP: 1.01 (ref 1–1.03)
URINE REFLEX TO CULTURE: ABNORMAL
UROBILINOGEN UR STRIP-ACNC: 1 E.U./DL
WBC # BLD AUTO: 9 K/UL (ref 4.5–11)
WBC #/AREA URNS AUTO: ABNORMAL /HPF (ref 0–5)

## 2025-04-21 PROCEDURE — 86780 TREPONEMA PALLIDUM: CPT

## 2025-04-21 PROCEDURE — 3700000025 EPIDURAL BLOCK: Performed by: NURSE ANESTHETIST, CERTIFIED REGISTERED

## 2025-04-21 PROCEDURE — 87389 HIV-1 AG W/HIV-1&-2 AB AG IA: CPT

## 2025-04-21 PROCEDURE — 10907ZC DRAINAGE OF AMNIOTIC FLUID, THERAPEUTIC FROM PRODUCTS OF CONCEPTION, VIA NATURAL OR ARTIFICIAL OPENING: ICD-10-PCS | Performed by: OBSTETRICS & GYNECOLOGY

## 2025-04-21 PROCEDURE — 1220000000 HC SEMI PRIVATE OB R&B

## 2025-04-21 PROCEDURE — 59409 OBSTETRICAL CARE: CPT | Performed by: ADVANCED PRACTICE MIDWIFE

## 2025-04-21 PROCEDURE — 86901 BLOOD TYPING SEROLOGIC RH(D): CPT

## 2025-04-21 PROCEDURE — 6360000002 HC RX W HCPCS: Performed by: ADVANCED PRACTICE MIDWIFE

## 2025-04-21 PROCEDURE — 80053 COMPREHEN METABOLIC PANEL: CPT

## 2025-04-21 PROCEDURE — 85025 COMPLETE CBC W/AUTO DIFF WBC: CPT

## 2025-04-21 PROCEDURE — 6360000002 HC RX W HCPCS: Performed by: STUDENT IN AN ORGANIZED HEALTH CARE EDUCATION/TRAINING PROGRAM

## 2025-04-21 PROCEDURE — 2580000003 HC RX 258: Performed by: STUDENT IN AN ORGANIZED HEALTH CARE EDUCATION/TRAINING PROGRAM

## 2025-04-21 PROCEDURE — 0UQMXZZ REPAIR VULVA, EXTERNAL APPROACH: ICD-10-PCS | Performed by: OBSTETRICS & GYNECOLOGY

## 2025-04-21 PROCEDURE — 2500000003 HC RX 250 WO HCPCS: Performed by: STUDENT IN AN ORGANIZED HEALTH CARE EDUCATION/TRAINING PROGRAM

## 2025-04-21 PROCEDURE — 80307 DRUG TEST PRSMV CHEM ANLYZR: CPT

## 2025-04-21 PROCEDURE — 81001 URINALYSIS AUTO W/SCOPE: CPT

## 2025-04-21 PROCEDURE — 86850 RBC ANTIBODY SCREEN: CPT

## 2025-04-21 PROCEDURE — 87340 HEPATITIS B SURFACE AG IA: CPT

## 2025-04-21 PROCEDURE — 6370000000 HC RX 637 (ALT 250 FOR IP): Performed by: ADVANCED PRACTICE MIDWIFE

## 2025-04-21 PROCEDURE — 86900 BLOOD TYPING SEROLOGIC ABO: CPT

## 2025-04-21 RX ORDER — CALCIUM CARBONATE 500 MG/1
1000 TABLET, CHEWABLE ORAL 3 TIMES DAILY PRN
Status: DISCONTINUED | OUTPATIENT
Start: 2025-04-21 | End: 2025-04-22 | Stop reason: HOSPADM

## 2025-04-21 RX ORDER — SODIUM CHLORIDE 0.9 % (FLUSH) 0.9 %
5-40 SYRINGE (ML) INJECTION EVERY 12 HOURS SCHEDULED
Status: DISCONTINUED | OUTPATIENT
Start: 2025-04-21 | End: 2025-04-21

## 2025-04-21 RX ORDER — SODIUM CHLORIDE, SODIUM LACTATE, POTASSIUM CHLORIDE, AND CALCIUM CHLORIDE .6; .31; .03; .02 G/100ML; G/100ML; G/100ML; G/100ML
500 INJECTION, SOLUTION INTRAVENOUS PRN
Status: DISCONTINUED | OUTPATIENT
Start: 2025-04-21 | End: 2025-04-21

## 2025-04-21 RX ORDER — DOCUSATE SODIUM 100 MG/1
100 CAPSULE, LIQUID FILLED ORAL 2 TIMES DAILY PRN
Status: DISCONTINUED | OUTPATIENT
Start: 2025-04-21 | End: 2025-04-22 | Stop reason: HOSPADM

## 2025-04-21 RX ORDER — ACETAMINOPHEN 500 MG
1000 TABLET ORAL EVERY 8 HOURS SCHEDULED
Status: DISCONTINUED | OUTPATIENT
Start: 2025-04-21 | End: 2025-04-22 | Stop reason: HOSPADM

## 2025-04-21 RX ORDER — DIPHENHYDRAMINE HYDROCHLORIDE 50 MG/ML
12.5 INJECTION, SOLUTION INTRAMUSCULAR; INTRAVENOUS EVERY 6 HOURS PRN
Status: DISCONTINUED | OUTPATIENT
Start: 2025-04-21 | End: 2025-04-22 | Stop reason: HOSPADM

## 2025-04-21 RX ORDER — CARBOPROST TROMETHAMINE 250 UG/ML
250 INJECTION, SOLUTION INTRAMUSCULAR PRN
Status: DISCONTINUED | OUTPATIENT
Start: 2025-04-21 | End: 2025-04-21

## 2025-04-21 RX ORDER — NALOXONE HYDROCHLORIDE 0.4 MG/ML
INJECTION, SOLUTION INTRAMUSCULAR; INTRAVENOUS; SUBCUTANEOUS PRN
Status: DISCONTINUED | OUTPATIENT
Start: 2025-04-21 | End: 2025-04-21

## 2025-04-21 RX ORDER — ONDANSETRON 4 MG/1
4 TABLET, ORALLY DISINTEGRATING ORAL EVERY 6 HOURS PRN
Status: DISCONTINUED | OUTPATIENT
Start: 2025-04-21 | End: 2025-04-21 | Stop reason: SDUPTHER

## 2025-04-21 RX ORDER — SODIUM CHLORIDE 9 MG/ML
INJECTION, SOLUTION INTRAVENOUS PRN
Status: DISCONTINUED | OUTPATIENT
Start: 2025-04-21 | End: 2025-04-21

## 2025-04-21 RX ORDER — METHYLERGONOVINE MALEATE 0.2 MG/ML
200 INJECTION INTRAVENOUS PRN
Status: DISCONTINUED | OUTPATIENT
Start: 2025-04-21 | End: 2025-04-21

## 2025-04-21 RX ORDER — MISOPROSTOL 200 UG/1
400 TABLET ORAL PRN
Status: DISCONTINUED | OUTPATIENT
Start: 2025-04-21 | End: 2025-04-22 | Stop reason: HOSPADM

## 2025-04-21 RX ORDER — NALBUPHINE HYDROCHLORIDE 10 MG/ML
5 INJECTION INTRAMUSCULAR; INTRAVENOUS; SUBCUTANEOUS
Status: DISCONTINUED | OUTPATIENT
Start: 2025-04-21 | End: 2025-04-21

## 2025-04-21 RX ORDER — SODIUM CHLORIDE 0.9 % (FLUSH) 0.9 %
5-40 SYRINGE (ML) INJECTION PRN
Status: DISCONTINUED | OUTPATIENT
Start: 2025-04-21 | End: 2025-04-21

## 2025-04-21 RX ORDER — SODIUM CHLORIDE, SODIUM LACTATE, POTASSIUM CHLORIDE, AND CALCIUM CHLORIDE .6; .31; .03; .02 G/100ML; G/100ML; G/100ML; G/100ML
1000 INJECTION, SOLUTION INTRAVENOUS PRN
Status: DISCONTINUED | OUTPATIENT
Start: 2025-04-21 | End: 2025-04-21

## 2025-04-21 RX ORDER — MODIFIED LANOLIN
OINTMENT (GRAM) TOPICAL PRN
Status: DISCONTINUED | OUTPATIENT
Start: 2025-04-21 | End: 2025-04-22 | Stop reason: HOSPADM

## 2025-04-21 RX ORDER — ONDANSETRON 2 MG/ML
4 INJECTION INTRAMUSCULAR; INTRAVENOUS EVERY 6 HOURS PRN
Status: DISCONTINUED | OUTPATIENT
Start: 2025-04-21 | End: 2025-04-21 | Stop reason: SDUPTHER

## 2025-04-21 RX ORDER — ONDANSETRON 4 MG/1
4 TABLET, FILM COATED ORAL EVERY 4 HOURS PRN
Status: DISCONTINUED | OUTPATIENT
Start: 2025-04-21 | End: 2025-04-22 | Stop reason: HOSPADM

## 2025-04-21 RX ORDER — IBUPROFEN 800 MG/1
800 TABLET, FILM COATED ORAL EVERY 8 HOURS SCHEDULED
Status: DISCONTINUED | OUTPATIENT
Start: 2025-04-21 | End: 2025-04-22 | Stop reason: HOSPADM

## 2025-04-21 RX ORDER — SODIUM CHLORIDE, SODIUM LACTATE, POTASSIUM CHLORIDE, CALCIUM CHLORIDE 600; 310; 30; 20 MG/100ML; MG/100ML; MG/100ML; MG/100ML
INJECTION, SOLUTION INTRAVENOUS CONTINUOUS
Status: DISCONTINUED | OUTPATIENT
Start: 2025-04-21 | End: 2025-04-21

## 2025-04-21 RX ORDER — ONDANSETRON 2 MG/ML
4 INJECTION INTRAMUSCULAR; INTRAVENOUS EVERY 4 HOURS PRN
Status: DISCONTINUED | OUTPATIENT
Start: 2025-04-21 | End: 2025-04-22 | Stop reason: HOSPADM

## 2025-04-21 RX ORDER — NALBUPHINE HYDROCHLORIDE 10 MG/ML
5 INJECTION INTRAMUSCULAR; INTRAVENOUS; SUBCUTANEOUS
Status: COMPLETED | OUTPATIENT
Start: 2025-04-21 | End: 2025-04-21

## 2025-04-21 RX ADMIN — Medication 5 ML: at 07:23

## 2025-04-21 RX ADMIN — BENZOCAINE AND LEVOMENTHOL: 200; 5 SPRAY TOPICAL at 13:50

## 2025-04-21 RX ADMIN — NALBUPHINE HYDROCHLORIDE 5 MG: 10 INJECTION, SOLUTION INTRAMUSCULAR; INTRAVENOUS; SUBCUTANEOUS at 03:21

## 2025-04-21 RX ADMIN — ACETAMINOPHEN 1000 MG: 500 TABLET ORAL at 13:50

## 2025-04-21 RX ADMIN — IBUPROFEN 800 MG: 800 TABLET, FILM COATED ORAL at 13:50

## 2025-04-21 RX ADMIN — SODIUM CHLORIDE, SODIUM LACTATE, POTASSIUM CHLORIDE, AND CALCIUM CHLORIDE: .6; .31; .03; .02 INJECTION, SOLUTION INTRAVENOUS at 04:10

## 2025-04-21 RX ADMIN — ACETAMINOPHEN 1000 MG: 500 TABLET ORAL at 22:14

## 2025-04-21 RX ADMIN — Medication 12 ML/HR: at 07:25

## 2025-04-21 RX ADMIN — SODIUM CHLORIDE, SODIUM LACTATE, POTASSIUM CHLORIDE, AND CALCIUM CHLORIDE 1000 ML: .6; .31; .03; .02 INJECTION, SOLUTION INTRAVENOUS at 03:06

## 2025-04-21 RX ADMIN — Medication 5 ML: at 07:11

## 2025-04-21 RX ADMIN — IBUPROFEN 800 MG: 800 TABLET, FILM COATED ORAL at 22:14

## 2025-04-21 RX ADMIN — OXYTOCIN 87.3 MILLI-UNITS/MIN: 10 INJECTION, SOLUTION INTRAMUSCULAR; INTRAVENOUS at 12:28

## 2025-04-21 RX ADMIN — OXYTOCIN 166.7 ML: 10 INJECTION, SOLUTION INTRAMUSCULAR; INTRAVENOUS at 12:18

## 2025-04-21 RX ADMIN — DIPHENHYDRAMINE HYDROCHLORIDE 12.5 MG: 50 INJECTION INTRAMUSCULAR; INTRAVENOUS at 13:50

## 2025-04-21 ASSESSMENT — PAIN DESCRIPTION - ORIENTATION: ORIENTATION: LOWER

## 2025-04-21 ASSESSMENT — PAIN DESCRIPTION - LOCATION
LOCATION: ABDOMEN
LOCATION: BACK
LOCATION: ABDOMEN

## 2025-04-21 ASSESSMENT — PAIN SCALES - GENERAL: PAINLEVEL_OUTOF10: 1

## 2025-04-21 ASSESSMENT — PAIN DESCRIPTION - DESCRIPTORS
DESCRIPTORS: CRAMPING
DESCRIPTORS: CRAMPING

## 2025-04-21 NOTE — ANESTHESIA PRE PROCEDURE
Department of Anesthesiology  Preprocedure Note       Name:  Celsa Amador   Age:  19 y.o.  :  2005                                          MRN:  [id number]         Date:  2025      Surgeon: * No surgeons listed *    Procedure: * No procedures listed *    Medications prior to admission:   Prior to Admission medications    Medication Sig Start Date End Date Taking? Authorizing Provider   Prenatal Vit-Fe Fumarate-FA (PRENATAL VITAMIN) 27-0.8 MG TABS Take 1 tablet by mouth daily 10/11/24  Yes Suzanne Lang MD       Current medications:    Current Facility-Administered Medications   Medication Dose Route Frequency Provider Last Rate Last Admin    lactated ringers infusion   IntraVENous Continuous Karen Bellamy,  125 mL/hr at 25 0410 New Bag at 25 0410    lactated ringers bolus 500 mL  500 mL IntraVENous PRN Karen Bellamy,         Or    lactated ringers bolus 1,000 mL  1,000 mL IntraVENous PRN Karen Bellamy, DO   Stopped at 25 0406    sodium chloride flush 0.9 % injection 5-40 mL  5-40 mL IntraVENous 2 times per day Karen Bellamy,         sodium chloride flush 0.9 % injection 5-40 mL  5-40 mL IntraVENous PRN Karen Bellamy, DO        0.9 % sodium chloride infusion   IntraVENous PRN Karen Bellamy, DO        methylergonovine (METHERGINE) injection 200 mcg  200 mcg IntraMUSCular PRN Karen Bellamy, DO        carboprost (HEMABATE) injection 250 mcg  250 mcg IntraMUSCular PRN Karen Bellamy, DO        tranexamic acid (CYKLOKAPRON) 1,000 mg in sodium chloride 0.9 % 110 mL IVPB (addEASE)  1,000 mg IntraVENous Once PRN Karen Bellamy, DO        acetaminophen (TYLENOL) tablet 1,000 mg  1,000 mg Oral 3 times per day Karen Bellamy, DO        benzocaine-menthol (DERMOPLAST) 20-0.5 % spray   Topical PRN Karen Bellamy, DO        nalbuphine (NUBAIN) injection 5 mg  5 mg IntraMUSCular Once PRN Kontarovich, Karen, DO        sodium chloride flush 0.9 % injection

## 2025-04-21 NOTE — PLAN OF CARE
Problem: Pain  Goal: Verbalizes/displays adequate comfort level or baseline comfort level  4/21/2025 1941 by Ginny Rhodes RN  Outcome: Progressing  4/21/2025 1229 by Brady Sorto RN  Outcome: Progressing  4/21/2025 0806 by Brady Sorto RN  Outcome: Progressing     Problem: Infection - Adult  Goal: Absence of infection at discharge  4/21/2025 1941 by Ginny Rhodes RN  Outcome: Progressing  4/21/2025 1229 by Brady Sorto RN  Outcome: Progressing  4/21/2025 0806 by Brady Sorto RN  Outcome: Progressing  Flowsheets (Taken 4/21/2025 0734)  Absence of infection at discharge: Assess and monitor for signs and symptoms of infection  Goal: Absence of infection during hospitalization  4/21/2025 1941 by Ginny Rhodes RN  Outcome: Progressing  4/21/2025 1229 by Brady Sorto RN  Outcome: Progressing  4/21/2025 0806 by Brady Sorto RN  Outcome: Progressing  Flowsheets (Taken 4/21/2025 0734)  Absence of infection during hospitalization: Assess and monitor for signs and symptoms of infection  Goal: Absence of fever/infection during anticipated neutropenic period  4/21/2025 1941 by Ginny Rhodes RN  Outcome: Progressing  4/21/2025 1229 by Brady Sorto RN  Outcome: Progressing  4/21/2025 0806 by Brady Sorto RN  Outcome: Progressing     Problem: Safety - Adult  Goal: Free from fall injury  4/21/2025 1941 by Ginny Rhodes RN  Outcome: Progressing  4/21/2025 1229 by Brady Sorto RN  Outcome: Progressing  4/21/2025 0806 by Brady Sorto RN  Outcome: Progressing     Problem: Discharge Planning  Goal: Discharge to home or other facility with appropriate resources  4/21/2025 1941 by Ginny Rhodes RN  Outcome: Progressing  4/21/2025 1229 by Brady Sorto RN  Outcome: Progressing  4/21/2025 0806 by Brady Sorto RN  Outcome: Progressing  Flowsheets (Taken 4/21/2025 0734)  Discharge to home or other facility with appropriate resources: Identify barriers to discharge with patient and caregiver     Problem:

## 2025-04-21 NOTE — PLAN OF CARE
Problem: Pain  Goal: Verbalizes/displays adequate comfort level or baseline comfort level  4/21/2025 1229 by Brady Sorto RN  Outcome: Progressing  4/21/2025 0806 by Brady Sorto RN  Outcome: Progressing     Problem: Infection - Adult  Goal: Absence of infection at discharge  4/21/2025 1229 by Brady Sorto RN  Outcome: Progressing  4/21/2025 0806 by Brady Sorto RN  Outcome: Progressing  Flowsheets (Taken 4/21/2025 0734)  Absence of infection at discharge: Assess and monitor for signs and symptoms of infection  Goal: Absence of infection during hospitalization  4/21/2025 1229 by Brady Sorto RN  Outcome: Progressing  4/21/2025 0806 by Brady Sorto RN  Outcome: Progressing  Flowsheets (Taken 4/21/2025 0734)  Absence of infection during hospitalization: Assess and monitor for signs and symptoms of infection  Goal: Absence of fever/infection during anticipated neutropenic period  4/21/2025 1229 by Brady Sorto RN  Outcome: Progressing  4/21/2025 0806 by Brady Sorto RN  Outcome: Progressing     Problem: Safety - Adult  Goal: Free from fall injury  4/21/2025 1229 by Brady Sorto RN  Outcome: Progressing  4/21/2025 0806 by Brady Sorto RN  Outcome: Progressing     Problem: Discharge Planning  Goal: Discharge to home or other facility with appropriate resources  4/21/2025 1229 by Brady Sorto RN  Outcome: Progressing  4/21/2025 0806 by Brady Sorto RN  Outcome: Progressing  Flowsheets (Taken 4/21/2025 0734)  Discharge to home or other facility with appropriate resources: Identify barriers to discharge with patient and caregiver     Problem: Chronic Conditions and Co-morbidities  Goal: Patient's chronic conditions and co-morbidity symptoms are monitored and maintained or improved  4/21/2025 1229 by Brady Sorto RN  Outcome: Progressing  4/21/2025 0806 by Brady Sorto RN  Outcome: Progressing  Flowsheets (Taken 4/21/2025 0734)  Care Plan - Patient's Chronic Conditions and Co-Morbidity Symptoms

## 2025-04-21 NOTE — L&D DELIVERY SUMMARY NOTE
Celsa Amador is a 19 y.o.  at 38w3d.  She was admitted to L&D 2025 12:43 AM     Her prenatal course was complicated by:  Patient Active Problem List    Diagnosis Date Noted    Normal labor and delivery 2023       Induction Method:  None  Augmentation:  AROM  Pain Management: Epidural  Group B Strep:    Group B Strep Culture   Date Value Ref Range Status   2025   Final    Rule Out Grp.B Strep:  NEGATIVE FOR GROUP B STREPTOCOCCI  Performed at [address] [address] ([phone]        A vigorous Female infant was spontaneously delivered and placed to mother's chest.  Cord clamping was delayed until no longer pulsating.     - APGAR's:  9, 9   - Nuchal Cord:  None.     - Body Cord:  Wrapped around the left ankle   - Weight:   Information for the patient's :  Niki Amador [[id number]]   Birth Weight: N/A   Placenta:  Parks, intact.  Episiotomy:  Not Performed  Perineum:  Intact  Other Laceration(s):  Right labial laceration, repaired    Vaginal sweep performed, sponge and instrument counts were verified upon procedure completion.  Fundus remains firm, below umbilicus, bleeding is scant to small.       Complications:  None  Estimated Blood Loss:  150 ml    Placenta:  Discarded    Electronically signed by ARJUN Reese CNM on 25 at 12:29 PM EDT

## 2025-04-21 NOTE — ANESTHESIA POSTPROCEDURE EVALUATION
Department of Anesthesiology  Postprocedure Note    Patient: Celsa Amador  MRN: [id number]  YOB: 2005  Date of evaluation: 4/21/2025    Procedure Summary       Date: 04/21/25 Room / Location:     Anesthesia Start: 0700 Anesthesia Stop: 1210    Procedure: Labor Analgesia Diagnosis:     Scheduled Providers:  Responsible Provider:     Anesthesia Type: Epidural ASA Status: 2            Anesthesia Type: Epidural    Laura Phase I: Laura Score: 10    Laura Phase II:      Anesthesia Post Evaluation    Patient location during evaluation: PACU  Patient participation: complete - patient participated  Level of consciousness: awake  Pain score: 0  Airway patency: patent  Nausea & Vomiting: no nausea and no vomiting  Cardiovascular status: hemodynamically stable  Respiratory status: acceptable, nonlabored ventilation and room air  Hydration status: stable  Pain management: adequate        No notable events documented.

## 2025-04-21 NOTE — PROGRESS NOTES
Department of Obstetrics and Gynecology  Labor and Delivery   Intrapartum Progress Note    SUBJECTIVE:     Comfortable with epidural.    Fetal tracing is reassuring, but contractions have gradually spaced out, and she has not made meaningful cervical change over the past 4 hours.    Discussed performing AROM, if contractions remain spaced out, augmenting with Pitocin.    Head was not well applied to cervix, following AROM head is now well applied and descended to 0 station.    OBJECTIVE:     Patient Vitals for the past 4 hrs:   BP Temp Temp src Pulse Resp SpO2   04/21/25 1138 110/74 97.4 °F (36.3 °C) Oral 90 -- --   04/21/25 1130 -- -- -- -- -- 100 %   04/21/25 1125 -- -- -- -- -- 100 %   04/21/25 1122 (!) 88/52 -- -- 77 18 --   04/21/25 1120 -- -- -- -- -- 100 %   04/21/25 1115 -- -- -- -- -- 100 %   04/21/25 1110 -- -- -- -- -- 100 %   04/21/25 1105 -- -- -- -- -- 100 %   04/21/25 1100 -- -- -- -- -- 100 %   04/21/25 1055 -- -- -- -- -- 100 %   04/21/25 1050 -- -- -- -- -- 100 %   04/21/25 1045 -- -- -- -- -- 100 %   04/21/25 1040 -- -- -- -- -- 100 %   04/21/25 1039 (!) 90/50 -- -- 81 -- --   04/21/25 1037 (!) 80/50 -- -- 86 18 100 %   04/21/25 1035 -- -- -- -- -- 100 %   04/21/25 1030 -- -- -- -- -- 100 %   04/21/25 1025 -- -- -- -- -- 100 %   04/21/25 1020 -- -- -- -- -- 100 %   04/21/25 1015 -- -- -- -- -- 100 %   04/21/25 1010 -- -- -- -- -- 100 %   04/21/25 1005 -- -- -- -- -- 100 %   04/21/25 1000 -- -- -- -- -- 100 %   04/21/25 0955 -- -- -- -- -- 100 %   04/21/25 0952 110/70 98 °F (36.7 °C) Oral (!) 111 16 100 %   04/21/25 0950 -- -- -- -- -- 100 %   04/21/25 0945 -- -- -- -- -- 100 %   04/21/25 0942 -- -- -- -- -- 91 %   04/21/25 0940 -- -- -- -- -- 100 %   04/21/25 0935 -- -- -- -- -- 100 %   04/21/25 0930 -- -- -- -- -- 100 %   04/21/25 0925 -- -- -- -- -- 100 %   04/21/25 0920 -- -- -- -- -- 100 %   04/21/25 0915 -- -- -- -- -- 100 %   04/21/25 0910 -- -- -- -- -- 100 %   04/21/25 0907 118/76 -- -- 85

## 2025-04-21 NOTE — FLOWSHEET NOTE
RN called in house OB, Dr. IVAN with update on SVE and EFM. Received orders to keep patient overnight for observation and administer IV fluids. Obtain ultrasound in AM.

## 2025-04-21 NOTE — FLOWSHEET NOTE
Patient arrived in triage with complaints of sharp pelvic pain over the last two hours. Patient denies bleeding and leaking of fluid and injury, states she has had intercourse in the last 24 hours.

## 2025-04-21 NOTE — ANESTHESIA POSTPROCEDURE EVALUATION
Department of Anesthesiology  Postprocedure Note    Patient: Celsa Amador  MRN: [id number]  YOB: 2005  Date of evaluation: 4/21/2025    Procedure Summary       Date: 04/21/25 Room / Location:     Anesthesia Start: 0700 Anesthesia Stop: 1210    Procedure: Labor Analgesia Diagnosis:     Scheduled Providers:  Responsible Provider: Lawrence Landin APRN - CRNA    Anesthesia Type: Epidural ASA Status: 2            Anesthesia Type: Epidural    Laura Phase I: Laura Score: 10    Laura Phase II:      Anesthesia Post Evaluation    Patient location during evaluation: bedside  Patient participation: complete - patient participated  Level of consciousness: awake  Pain score: 0  Airway patency: patent  Nausea & Vomiting: no nausea and no vomiting  Cardiovascular status: hemodynamically stable  Respiratory status: acceptable, nonlabored ventilation, room air and spontaneous ventilation  Hydration status: stable  Pain management: adequate        There were no known notable events for this encounter.

## 2025-04-21 NOTE — FLOWSHEET NOTE
0655 - MASSIEL Stephenson CRNA at bedside, pt sitting up for epidural  0659 - timeout  0700 - start  0708 - test dose  0709 - catheter placed  0711 - bolus   0715 - pt assisted to lay down by CRNA and RN

## 2025-04-21 NOTE — H&P
Obstetrical History and Physical    CHIEF COMPLAINT:  contractions    HISTORY OF PRESENT ILLNESS:      The patient is a 19 y.o. female at 38w3d admitted for active labor     Patient is being admitted for active labor   Denies DFM/VB/HA/EpigastricPain/Visual changes     Estimated Due Date: Estimated Date of Delivery: 5/2/25    PC-01 HARDSTOP.  Current EGA is 38w3d  Is this patient being delivered between 98p8e-64n5p weeks with an acceptable medical indication (obstetric, maternal, and/or fetal)?  YES: This patient is between 92c1v-62g6g and DOES have an acceptable indication(s) for delivery: (radha all that apply) OBSTETRIC INDICATIONS: Active, early, or spontaneous labor, FETAL INDICATIONS: N/A: fetal indications not applicable to this patient. See obstetrical or maternal, MATERNAL INDICATIONS: N/A: maternal indications not applicable for this patient. See fetal or obstetrical    PAST OB HISTORY:  Detailed OB History:  1. TSVD  2. Current     Past Medical History:        Diagnosis Date    Bursal cyst     Right knee     Dysmenorrhea in adolescent 11/09/2021    Osteochondroma of right femur     Distal    Osteochondroma of right femur        Past Surgical History:        Procedure Laterality Date    FEMUR SURGERY Right 09/19/2017    Removal of osteochondroma from the right femur    [place] I&D DEEP ABSC BURSA/HEMATOMA THIGH/KNEE REGION Right 09/19/2017    RIGHT FEMUR OPEN EXCISION DISTAL FEMUR OSTEOCHONDROMA  (30 MIN) performed by Lauro Hancock MD at [facility] OR       Allergies:    Patient has no known allergies.    Social History:    Social History     Socioeconomic History    Marital status: Single     Spouse name: Not on file    Number of children: Not on file    Years of education: Not on file    Highest education level: Not on file   Occupational History    Not on file   Tobacco Use    Smoking status: Never    Smokeless tobacco: Never   Vaping Use    Vaping status: Never Used   Substance and Sexual Activity

## 2025-04-21 NOTE — FLOWSHEET NOTE
RN placed call to in house OB, Dr. IVAN to update with most recent SVE. Received orders to admit patient to floor for normal labor and delivery. Okay for patient to get epidural when she is ready.

## 2025-04-21 NOTE — PLAN OF CARE
Problem: Pain  Goal: Verbalizes/displays adequate comfort level or baseline comfort level  Outcome: Progressing     Problem: Infection - Adult  Goal: Absence of infection at discharge  Outcome: Progressing  Flowsheets (Taken 4/21/2025 0734)  Absence of infection at discharge: Assess and monitor for signs and symptoms of infection  Goal: Absence of infection during hospitalization  Outcome: Progressing  Flowsheets (Taken 4/21/2025 0734)  Absence of infection during hospitalization: Assess and monitor for signs and symptoms of infection  Goal: Absence of fever/infection during anticipated neutropenic period  Outcome: Progressing     Problem: Safety - Adult  Goal: Free from fall injury  Outcome: Progressing     Problem: Discharge Planning  Goal: Discharge to home or other facility with appropriate resources  Outcome: Progressing  Flowsheets (Taken 4/21/2025 0734)  Discharge to home or other facility with appropriate resources: Identify barriers to discharge with patient and caregiver     Problem: Chronic Conditions and Co-morbidities  Goal: Patient's chronic conditions and co-morbidity symptoms are monitored and maintained or improved  Outcome: Progressing  Flowsheets (Taken 4/21/2025 0734)  Care Plan - Patient's Chronic Conditions and Co-Morbidity Symptoms are Monitored and Maintained or Improved: Monitor and assess patient's chronic conditions and comorbid symptoms for stability, deterioration, or improvement

## 2025-04-21 NOTE — ANESTHESIA PROCEDURE NOTES
Epidural Block    Patient location during procedure: OB  Start time: 4/21/2025 7:00 AM  End time: 4/21/2025 7:06 AM  Reason for block: labor epidural  Staffing  Resident/CRNA: Lawrence Landin APRN - CRNA  Performed by: Lawrence Landin APRN - CRNA  Authorized by: Lawrence Landin APRN - CRNA    Epidural  Patient position: sitting  Prep: Betadine  Patient monitoring: cardiac monitor, continuous pulse ox and frequent blood pressure checks  Approach: midline  Location: L3-4  Injection technique: JELANI air  Provider prep: mask and sterile gloves  Needle  Needle type: Tuohy   Needle gauge: 17 G  Needle length: 3.5 in  Needle insertion depth: 5 cm  Catheter type: side hole  Catheter size: 20 G  Catheter at skin depth: 11 cm  Test dose: negative  Kit: perifix CEA TRAY  Lot number: [id number]  Expiration date: 1/31/2026Catheter Secured: tegaderm and tape  Assessment  Sensory level: T8  Hemodynamics: stable  Attempts: 1  Outcomes: uncomplicated and patient tolerated procedure well  Additional Notes  Pt has adequate block, VSS, FHR stable  Preanesthetic Checklist  Completed: patient identified, IV checked, site marked, risks and benefits discussed, surgical/procedural consents, equipment checked, pre-op evaluation, timeout performed, anesthesia consent given, oxygen available, monitors applied/VS acknowledged, fire risk safety assessment completed and verbalized and blood product R/B/A discussed and consented

## 2025-04-22 VITALS
TEMPERATURE: 97.6 F | SYSTOLIC BLOOD PRESSURE: 122 MMHG | DIASTOLIC BLOOD PRESSURE: 84 MMHG | RESPIRATION RATE: 16 BRPM | OXYGEN SATURATION: 100 % | HEART RATE: 74 BPM

## 2025-04-22 LAB
BASOPHILS # BLD: 0.1 K/UL (ref 0–0.2)
BASOPHILS NFR BLD: 0.5 %
EOSINOPHIL # BLD: 0.1 K/UL (ref 0–0.7)
EOSINOPHIL NFR BLD: 0.8 %
ERYTHROCYTE [DISTWIDTH] IN BLOOD BY AUTOMATED COUNT: 13.2 % (ref 11.5–14.5)
HCT VFR BLD AUTO: 29.6 % (ref 37–47)
HGB BLD-MCNC: 9.6 G/DL (ref 12–16)
LYMPHOCYTES # BLD: 2.6 K/UL (ref 1–4.8)
LYMPHOCYTES NFR BLD: 21 %
MCH RBC QN AUTO: 27.2 PG (ref 27–31.3)
MCHC RBC AUTO-ENTMCNC: 32.4 % (ref 33–37)
MCV RBC AUTO: 83.9 FL (ref 79.4–94.8)
MONOCYTES # BLD: 0.7 K/UL (ref 0.2–0.8)
MONOCYTES NFR BLD: 5.5 %
NEUTROPHILS # BLD: 8.9 K/UL (ref 1.4–6.5)
NEUTS SEG NFR BLD: 71.8 %
PLATELET # BLD AUTO: 163 K/UL (ref 130–400)
RBC # BLD AUTO: 3.53 M/UL (ref 4.2–5.4)
WBC # BLD AUTO: 12.4 K/UL (ref 4.5–11)

## 2025-04-22 PROCEDURE — 85025 COMPLETE CBC W/AUTO DIFF WBC: CPT

## 2025-04-22 PROCEDURE — 7200000001 HC VAGINAL DELIVERY

## 2025-04-22 RX ORDER — ACETAMINOPHEN 500 MG
1000 TABLET ORAL EVERY 8 HOURS SCHEDULED
Qty: 120 TABLET | Refills: 3 | Status: SHIPPED | OUTPATIENT
Start: 2025-04-22

## 2025-04-22 RX ORDER — IBUPROFEN 800 MG/1
800 TABLET, FILM COATED ORAL EVERY 8 HOURS SCHEDULED
Qty: 120 TABLET | Refills: 3 | Status: SHIPPED | OUTPATIENT
Start: 2025-04-22

## 2025-04-22 RX ORDER — PSEUDOEPHEDRINE HCL 30 MG
100 TABLET ORAL 2 TIMES DAILY PRN
Qty: 30 CAPSULE | Refills: 1 | Status: SHIPPED | OUTPATIENT
Start: 2025-04-22

## 2025-04-22 NOTE — PROGRESS NOTES
Subjective:       19 y.o.   @ 38w3d    Postpartum Day 1: Vaginal Delivery on 2025    The patient feels well. The patient denies emotional concerns. Pain is well controlled with current medications.The patient is ambulating well. The patient is tolerating a normal diet.    Objective:      Patient Vitals for the past 8 hrs:   BP Temp Temp src Pulse Resp SpO2   25 0757 114/71 97.9 °F (36.6 °C) Oral 69 18 97 %   25 0450 115/73 97.9 °F (36.6 °C) Oral 71 18 --     General:    alert, appears stated age, and cooperative   Bowel Sounds:  active   Lochia:  appropriate   Uterine Fundus:   Firm @ U-2   Perineum:  Intact   DVT Evaluation:  No evidence of DVT seen on physical exam.         Assessment:     Status post Vaginal Delivery.       Plan:     1.Routine postpartum care.  2. Discharge Planning initiated      Jose M Rivera DO, MBA, FACOOG  2025

## 2025-04-22 NOTE — DISCHARGE SUMMARY
Obstetric Discharge Summary    Reasons for Admission on 2025 12:43 AM  Onset of Labor    Prenatal Procedures  None    Intrapartum Procedures  Vaginal Delivery    Postpartum Procedures  None    Exchange Data  Information for the patient's :  Niki Amador [[id number]]   female   Birth Weight: 3.223 kg (7 lb 1.7 oz)  Discharge With Mother  Complications: No    Discharge Diagnosis  Patient Active Problem List    Diagnosis Date Noted    Normal labor and delivery 2023       Discharge Information  Current Discharge Medication List        START taking these medications    Details   acetaminophen (TYLENOL) 500 MG tablet Take 2 tablets by mouth every 8 hours  Qty: 120 tablet, Refills: 3      ibuprofen (ADVIL;MOTRIN) 800 MG tablet Take 1 tablet by mouth every 8 hours  Qty: 120 tablet, Refills: 3      docusate sodium (COLACE, DULCOLAX) 100 MG CAPS Take 100 mg by mouth 2 times daily as needed for Constipation  Qty: 30 capsule, Refills: 1           CONTINUE these medications which have NOT CHANGED    Details   Prenatal Vit-Fe Fumarate-FA (PRENATAL VITAMIN) 27-0.8 MG TABS Take 1 tablet by mouth daily  Qty: 60 tablet, Refills: 1               Discharge to: Home    Condition on discharge: Stable    Discharge Date: 2025      Jose M Rivera DO, MBA, FACOOG, FACOG  2025 12:27 PM

## 2025-04-22 NOTE — PROGRESS NOTES
Anesthesia Note S/P epidural: Patient is up and around without difficulty. Denies any sensory or motor deficit of bilateral lower extremities.  Denies fever. Lumbar area dry, intact and no redness noted

## 2025-04-22 NOTE — PLAN OF CARE
Problem: Pain  Goal: Verbalizes/displays adequate comfort level or baseline comfort level  2025 by Carmenza Díaz, RN  Outcome: Completed  2025 by Carmenza Díaz, RN  Outcome: Progressing     Problem: Infection - Adult  Goal: Absence of infection at discharge  2025 by Carmenza Díaz, RN  Outcome: Completed  2025 by Carmenza Díaz, RN  Outcome: Progressing  Goal: Absence of infection during hospitalization  2025 by Carmenza Díaz, RN  Outcome: Completed  2025 by Carmenza Díaz, RN  Outcome: Progressing  Goal: Absence of fever/infection during anticipated neutropenic period  2025 by Carmenza Díaz, RN  Outcome: Completed  2025 by Carmenza Díaz, RN  Outcome: Progressing     Problem: Safety - Adult  Goal: Free from fall injury  2025 by Carmenza Díaz, RN  Outcome: Completed  2025 by Carmenza Díaz, RN  Outcome: Progressing     Problem: Discharge Planning  Goal: Discharge to home or other facility with appropriate resources  2025 by Carmenza Díaz, RN  Outcome: Completed  2025 by Carmenza Díaz, RN  Outcome: Progressing     Problem: Chronic Conditions and Co-morbidities  Goal: Patient's chronic conditions and co-morbidity symptoms are monitored and maintained or improved  2025 by Carmenza Díaz, RN  Outcome: Completed  2025 by Carmenza Díaz, RN  Outcome: Progressing     Problem: Postpartum  Goal: Experiences normal postpartum course  Description:  Postpartum OB-Pregnancy care plan goal which identifies if the mother is experiencing a normal postpartum course  2025 by Carmenza Díaz, RN  Outcome: Completed  2025 by Carmenza Díaz, RN  Outcome: Progressing  Goal: Appropriate maternal -  bonding  Description:  Postpartum OB-Pregnancy care plan goal which identifies if the mother and  are bonding

## 2025-04-22 NOTE — PLAN OF CARE
Problem: Pain  Goal: Verbalizes/displays adequate comfort level or baseline comfort level  2025 by Carmenza Díaz RN  Outcome: Progressing  2025 by Ginny Rhodes RN  Outcome: Progressing     Problem: Infection - Adult  Goal: Absence of infection at discharge  2025 by Carmenza Díaz, RN  Outcome: Progressing  2025 by Ginny Rhodes RN  Outcome: Progressing  Goal: Absence of infection during hospitalization  2025 by Carmenza Díaz RN  Outcome: Progressing  2025 by Ginny Rhodes RN  Outcome: Progressing  Goal: Absence of fever/infection during anticipated neutropenic period  2025 by Carmenza Díaz RN  Outcome: Progressing  2025 by Ginny Rhodes RN  Outcome: Progressing     Problem: Safety - Adult  Goal: Free from fall injury  2025 by Carmenza Díaz RN  Outcome: Progressing  2025 by Ginny Rhodes RN  Outcome: Progressing     Problem: Discharge Planning  Goal: Discharge to home or other facility with appropriate resources  2025 by Carmenza Díaz RN  Outcome: Progressing  2025 by Ginny Rhodes RN  Outcome: Progressing     Problem: Chronic Conditions and Co-morbidities  Goal: Patient's chronic conditions and co-morbidity symptoms are monitored and maintained or improved  2025 by Carmenza Díaz RN  Outcome: Progressing  2025 by Ginny Rhodes RN  Outcome: Progressing     Problem: Postpartum  Goal: Experiences normal postpartum course  Description:  Postpartum OB-Pregnancy care plan goal which identifies if the mother is experiencing a normal postpartum course  2025 by Carmenza Díaz, RN  Outcome: Progressing  2025 by Ginny Rhodes RN  Outcome: Progressing  Goal: Appropriate maternal -  bonding  Description:  Postpartum OB-Pregnancy care plan goal which identifies if the mother and  are bonding

## 2025-04-22 NOTE — PROGRESS NOTES
CLINICAL PHARMACY NOTE: MEDS TO BEDS    Total # of Prescriptions Filled: 3   The following medications were delivered to the patient:  Ibuprofen 800 mg Tab  Acetaminophen 500 mg Tab  Docusate 100 mg Cap    Additional Documentation:

## 2025-06-02 ENCOUNTER — OFFICE VISIT (OUTPATIENT)
Dept: OBGYN CLINIC | Age: 20
End: 2025-06-02

## 2025-06-02 VITALS
SYSTOLIC BLOOD PRESSURE: 122 MMHG | HEIGHT: 66 IN | WEIGHT: 122 LBS | BODY MASS INDEX: 19.61 KG/M2 | HEART RATE: 72 BPM | DIASTOLIC BLOOD PRESSURE: 70 MMHG

## 2025-06-02 DIAGNOSIS — N92.6 IRREGULAR MENSES: Primary | ICD-10-CM

## 2025-06-02 LAB
HCG, URINE, POC: NEGATIVE
Lab: NORMAL
NEGATIVE QC PASS/FAIL: NORMAL
POSITIVE QC PASS/FAIL: NORMAL

## 2025-06-02 NOTE — PROGRESS NOTES
kg/m².  Obesity:  Normalweight  Smoking:  No    Plan:   For repeat Depo-provera today     Obesity Counseling:  N/A  Smoking Counseling:  N/A       Orders Placed This Encounter   Procedures    POC Pregnancy Ur-Qual       Orders Placed This Encounter   Medications    medroxyPROGESTERone (DEPO-PROVERA) injection 150 mg       1. Return to the office: Return in about 3 months (around 9/2/2025) for depo-provera .  2. Signs & Symptoms of mastitis reviewed; notify if occurs  3.Secondary smoke risks reviewed. Increased risks of respiratory problems, Sudden     infant death syndrome, and potential malignancies.  4. Family planning counseling and STD counseling completed       Suzanne Lang M.D., F.A.C.O.G

## 2025-06-03 RX ORDER — MEDROXYPROGESTERONE ACETATE 150 MG/ML
150 INJECTION, SUSPENSION INTRAMUSCULAR ONCE
Status: COMPLETED | OUTPATIENT
Start: 2025-06-03 | End: 2025-06-03

## 2025-06-03 RX ADMIN — MEDROXYPROGESTERONE ACETATE 150 MG: 150 INJECTION, SUSPENSION INTRAMUSCULAR at 08:14

## 2025-08-20 ENCOUNTER — CLINICAL SUPPORT (OUTPATIENT)
Dept: OBGYN CLINIC | Age: 20
End: 2025-08-20
Payer: COMMERCIAL

## 2025-08-20 DIAGNOSIS — N92.6 IRREGULAR MENSES: Primary | ICD-10-CM

## 2025-08-20 LAB
HCG, URINE, POC: NEGATIVE
Lab: NORMAL
NEGATIVE QC PASS/FAIL: NORMAL
POSITIVE QC PASS/FAIL: NORMAL

## 2025-08-20 PROCEDURE — 81025 URINE PREGNANCY TEST: CPT | Performed by: OBSTETRICS & GYNECOLOGY

## 2025-08-20 PROCEDURE — 96372 THER/PROPH/DIAG INJ SC/IM: CPT | Performed by: OBSTETRICS & GYNECOLOGY

## 2025-08-20 RX ORDER — MEDROXYPROGESTERONE ACETATE 150 MG/ML
150 INJECTION, SUSPENSION INTRAMUSCULAR ONCE
Status: COMPLETED | OUTPATIENT
Start: 2025-08-20 | End: 2025-08-20

## 2025-08-20 RX ADMIN — MEDROXYPROGESTERONE ACETATE 150 MG: 150 INJECTION, SUSPENSION INTRAMUSCULAR at 11:28

## (undated) DEVICE — PAD,ABDOMINAL,8"X10",ST,LF: Brand: MEDLINE

## (undated) DEVICE — DRAPE SURG EXT FEN REINF ST W O FLD PCH STD

## (undated) DEVICE — SUTURE VCRL SZ 2-0 L36IN ABSRB UD L40MM CT 1/2 CIR J957H

## (undated) DEVICE — MAT FLR SURG QUICKWICK 28X54 IN DISP

## (undated) DEVICE — HIGH FLOW TIP

## (undated) DEVICE — DRESSING GZ W1XL8IN COT XRFRM N ADH OVERWRAP CURAD

## (undated) DEVICE — SPLINT ORTH 22IN KNEE BASIC

## (undated) DEVICE — MEDI-VAC YANKAUER SUCTION HANDLE W/BULBOUS TIP: Brand: CARDINAL HEALTH

## (undated) DEVICE — SIPS DUAL 2 MINUTE TIP

## (undated) DEVICE — UNDERCAST PADDING: Brand: DEROYAL

## (undated) DEVICE — TAPE MED W3XL2.5YD STRETCHED ELASTIKON

## (undated) DEVICE — STRAP SECUREMENT L AD W1.25XL2.75IN CATH ADH CATH-SECURE

## (undated) DEVICE — ELASTIC BANDAGE: Brand: DEROYAL

## (undated) DEVICE — STAPLER SKIN H3.9MM WIRE DIA0.58MM CRWN 6.9MM 35 STPL FIX

## (undated) DEVICE — 3M™ STERI-DRAPE™ U-DRAPE 1015: Brand: STERI-DRAPE™

## (undated) DEVICE — CATHETERIZATION KIT PEDIATRIC 16 FR 5 CC INDWL STR TIP BG

## (undated) DEVICE — CHLORAPREP 26ML ORANGE

## (undated) DEVICE — 2000CC GUARDIAN II: Brand: GUARDIAN

## (undated) DEVICE — SUTURE VCRL SZ 1 L27IN ABSRB UD L36MM CT-1 1/2 CIR JJ40G

## (undated) DEVICE — STERILE LATEX POWDER-FREE SURGICAL GLOVESWITH NITRILE COATING: Brand: PROTEXIS

## (undated) DEVICE — GLOVE SURG SZ 7.5 L11.73IN FNGR THK9.8MIL STRW LTX POLYMER

## (undated) DEVICE — GAUZE,SPONGE,4"X4",12PLY,STERILE,LF,2'S: Brand: MEDLINE